# Patient Record
Sex: MALE | Race: OTHER | HISPANIC OR LATINO | URBAN - METROPOLITAN AREA
[De-identification: names, ages, dates, MRNs, and addresses within clinical notes are randomized per-mention and may not be internally consistent; named-entity substitution may affect disease eponyms.]

---

## 2017-02-21 ENCOUNTER — OUTPATIENT (OUTPATIENT)
Dept: OUTPATIENT SERVICES | Facility: HOSPITAL | Age: 27
LOS: 1 days | End: 2017-02-21
Payer: MEDICAID

## 2017-02-21 ENCOUNTER — APPOINTMENT (OUTPATIENT)
Dept: INFECTIOUS DISEASE | Facility: CLINIC | Age: 27
End: 2017-02-21

## 2017-02-21 VITALS
HEART RATE: 70 BPM | TEMPERATURE: 98.3 F | BODY MASS INDEX: 19.55 KG/M2 | HEIGHT: 69 IN | DIASTOLIC BLOOD PRESSURE: 71 MMHG | SYSTOLIC BLOOD PRESSURE: 115 MMHG | OXYGEN SATURATION: 98 % | WEIGHT: 132 LBS

## 2017-02-21 DIAGNOSIS — B20 HUMAN IMMUNODEFICIENCY VIRUS [HIV] DISEASE: ICD-10-CM

## 2017-02-21 LAB
ALBUMIN SERPL ELPH-MCNC: 4.6 G/DL
ALP BLD-CCNC: 66 U/L
ALT SERPL-CCNC: 18 U/L
ANION GAP SERPL CALC-SCNC: 14 MMOL/L
AST SERPL-CCNC: 18 U/L
BILIRUB SERPL-MCNC: 0.3 MG/DL
BUN SERPL-MCNC: 11 MG/DL
CALCIUM SERPL-MCNC: 9.9 MG/DL
CHLORIDE SERPL-SCNC: 103 MMOL/L
CO2 SERPL-SCNC: 24 MMOL/L
CREAT SERPL-MCNC: 1.08 MG/DL
GLUCOSE SERPL-MCNC: 99 MG/DL
POTASSIUM SERPL-SCNC: 4.9 MMOL/L
PROT SERPL-MCNC: 7.2 G/DL
SODIUM SERPL-SCNC: 141 MMOL/L

## 2017-02-21 PROCEDURE — 36415 COLL VENOUS BLD VENIPUNCTURE: CPT

## 2017-02-21 PROCEDURE — 90670 PCV13 VACCINE IM: CPT

## 2017-02-21 PROCEDURE — G0463: CPT | Mod: 25

## 2017-02-21 PROCEDURE — 90834 PSYTX W PT 45 MINUTES: CPT | Mod: 25

## 2017-02-21 PROCEDURE — G0009: CPT

## 2017-02-22 LAB
C TRACH RRNA SPEC QL NAA+PROBE: NORMAL
CD3 CELLS # BLD: 1523 /UL
CD3 CELLS NFR BLD: 77 %
CD3+CD4+ CELLS # BLD: 549 /UL
CD3+CD4+ CELLS NFR BLD: 28 %
CD3+CD4+ CELLS/CD3+CD8+ CLL SPEC: 0.58 RATIO
CD3+CD8+ CELLS # SPEC: 945 /UL
CD3+CD8+ CELLS NFR BLD: 48 %
N GONORRHOEA RRNA SPEC QL NAA+PROBE: NORMAL
SOURCE AMPLIFICATION: NORMAL

## 2017-02-23 DIAGNOSIS — Z23 ENCOUNTER FOR IMMUNIZATION: ICD-10-CM

## 2017-02-23 LAB
HIV1 RNA # SERPL NAA+PROBE: NOT DETECTED COPIES/ML
VIRAL LOAD LOG: NOT DETECTED LG10COP/ML

## 2017-03-06 ENCOUNTER — APPOINTMENT (OUTPATIENT)
Dept: GASTROENTEROLOGY | Facility: CLINIC | Age: 27
End: 2017-03-06

## 2017-03-23 ENCOUNTER — RX RENEWAL (OUTPATIENT)
Age: 27
End: 2017-03-23

## 2017-06-22 ENCOUNTER — APPOINTMENT (OUTPATIENT)
Dept: INFECTIOUS DISEASE | Facility: CLINIC | Age: 27
End: 2017-06-22
Payer: SELF-PAY

## 2017-06-22 VITALS
TEMPERATURE: 97.6 F | SYSTOLIC BLOOD PRESSURE: 123 MMHG | OXYGEN SATURATION: 99 % | HEIGHT: 69 IN | WEIGHT: 126 LBS | HEART RATE: 89 BPM | DIASTOLIC BLOOD PRESSURE: 77 MMHG | BODY MASS INDEX: 18.66 KG/M2

## 2017-06-22 DIAGNOSIS — F12.90 CANNABIS USE, UNSPECIFIED, UNCOMPLICATED: ICD-10-CM

## 2017-06-22 PROCEDURE — 99214 OFFICE O/P EST MOD 30 MIN: CPT

## 2017-06-23 LAB
ALBUMIN SERPL ELPH-MCNC: 4.4 G/DL
ALP BLD-CCNC: 84 U/L
ALT SERPL-CCNC: 10 U/L
ANION GAP SERPL CALC-SCNC: 15 MMOL/L
AST SERPL-CCNC: 22 U/L
BILIRUB SERPL-MCNC: 0.6 MG/DL
BUN SERPL-MCNC: 9 MG/DL
CALCIUM SERPL-MCNC: 9.7 MG/DL
CD3 CELLS # BLD: 1257 /UL
CD3 CELLS NFR BLD: 77 %
CD3+CD4+ CELLS # BLD: 466 /UL
CD3+CD4+ CELLS NFR BLD: 28 %
CD3+CD4+ CELLS/CD3+CD8+ CLL SPEC: 0.6 RATIO
CD3+CD8+ CELLS # SPEC: 779 /UL
CD3+CD8+ CELLS NFR BLD: 47 %
CHLORIDE SERPL-SCNC: 102 MMOL/L
CO2 SERPL-SCNC: 25 MMOL/L
CREAT SERPL-MCNC: 0.99 MG/DL
GLUCOSE SERPL-MCNC: 82 MG/DL
POTASSIUM SERPL-SCNC: 4.1 MMOL/L
PROT SERPL-MCNC: 7.8 G/DL
SODIUM SERPL-SCNC: 142 MMOL/L

## 2017-06-26 LAB
HIV1 RNA # SERPL NAA+PROBE: NORMAL
HIV1 RNA # SERPL NAA+PROBE: NOT DETECTED COPIES/ML
VIRAL LOAD INTERP: NORMAL
VIRAL LOAD LOG: NOT DETECTED LG COP/ML

## 2017-12-20 ENCOUNTER — APPOINTMENT (OUTPATIENT)
Dept: INFECTIOUS DISEASE | Facility: CLINIC | Age: 27
End: 2017-12-20
Payer: MEDICAID

## 2017-12-20 ENCOUNTER — APPOINTMENT (OUTPATIENT)
Dept: INFECTIOUS DISEASE | Facility: CLINIC | Age: 27
End: 2017-12-20

## 2017-12-20 ENCOUNTER — LABORATORY RESULT (OUTPATIENT)
Age: 27
End: 2017-12-20

## 2017-12-20 ENCOUNTER — OTHER (OUTPATIENT)
Age: 27
End: 2017-12-20

## 2017-12-20 VITALS
BODY MASS INDEX: 20.73 KG/M2 | WEIGHT: 140 LBS | DIASTOLIC BLOOD PRESSURE: 75 MMHG | TEMPERATURE: 98.2 F | OXYGEN SATURATION: 97 % | HEART RATE: 83 BPM | HEIGHT: 69 IN | SYSTOLIC BLOOD PRESSURE: 127 MMHG | RESPIRATION RATE: 20 BRPM

## 2017-12-20 PROCEDURE — 99214 OFFICE O/P EST MOD 30 MIN: CPT | Mod: 25

## 2017-12-20 PROCEDURE — G0009: CPT

## 2017-12-20 PROCEDURE — 90732 PPSV23 VACC 2 YRS+ SUBQ/IM: CPT

## 2017-12-20 PROCEDURE — 90686 IIV4 VACC NO PRSV 0.5 ML IM: CPT

## 2017-12-20 PROCEDURE — 90472 IMMUNIZATION ADMIN EACH ADD: CPT

## 2017-12-20 PROCEDURE — G0008: CPT

## 2017-12-21 LAB
ALBUMIN SERPL ELPH-MCNC: 4.6 G/DL
ALP BLD-CCNC: 87 U/L
ALT SERPL-CCNC: 21 U/L
ANION GAP SERPL CALC-SCNC: 12 MMOL/L
AST SERPL-CCNC: 29 U/L
BILIRUB SERPL-MCNC: 0.4 MG/DL
BUN SERPL-MCNC: 11 MG/DL
C TRACH RRNA SPEC QL NAA+PROBE: NOT DETECTED
CALCIUM SERPL-MCNC: 9.9 MG/DL
CD3 CELLS # BLD: 1394 /UL
CD3 CELLS NFR BLD: 74 %
CD3+CD4+ CELLS # BLD: 490 /UL
CD3+CD4+ CELLS NFR BLD: 26 %
CD3+CD4+ CELLS/CD3+CD8+ CLL SPEC: 0.56 RATIO
CD3+CD8+ CELLS # SPEC: 877 /UL
CD3+CD8+ CELLS NFR BLD: 47 %
CHLORIDE SERPL-SCNC: 100 MMOL/L
CHOLEST SERPL-MCNC: 220 MG/DL
CHOLEST/HDLC SERPL: 5.5 RATIO
CO2 SERPL-SCNC: 27 MMOL/L
CREAT SERPL-MCNC: 0.9 MG/DL
GLUCOSE SERPL-MCNC: 84 MG/DL
HDLC SERPL-MCNC: 40 MG/DL
LDLC SERPL CALC-MCNC: 138 MG/DL
N GONORRHOEA RRNA SPEC QL NAA+PROBE: NOT DETECTED
POTASSIUM SERPL-SCNC: 4.2 MMOL/L
PROT SERPL-MCNC: 8.4 G/DL
SODIUM SERPL-SCNC: 139 MMOL/L
SOURCE AMPLIFICATION: NORMAL
T PALLIDUM AB SER QL IA: POSITIVE
TRIGL SERPL-MCNC: 211 MG/DL

## 2017-12-21 RX ORDER — NUT.TX.IMPAIRED DIGESTIVE FXN 0.035-1/ML
LIQUID (ML) ORAL
Qty: 240 | Refills: 0 | Status: ACTIVE | COMMUNITY
Start: 2017-12-21 | End: 1900-01-01

## 2017-12-22 ENCOUNTER — MOBILE ON CALL (OUTPATIENT)
Age: 27
End: 2017-12-22

## 2017-12-24 ENCOUNTER — MOBILE ON CALL (OUTPATIENT)
Age: 27
End: 2017-12-24

## 2018-01-01 LAB
HIV1 RNA # SERPL NAA+PROBE: NORMAL
HIV1 RNA # SERPL NAA+PROBE: NOT DETECTED
VIRAL LOAD INTERP: NORMAL
VIRAL LOAD LOG: NOT DETECTED

## 2018-06-25 ENCOUNTER — RX RENEWAL (OUTPATIENT)
Age: 28
End: 2018-06-25

## 2018-10-17 ENCOUNTER — APPOINTMENT (OUTPATIENT)
Dept: INFECTIOUS DISEASE | Facility: CLINIC | Age: 28
End: 2018-10-17

## 2019-01-31 ENCOUNTER — APPOINTMENT (OUTPATIENT)
Dept: INFECTIOUS DISEASE | Facility: CLINIC | Age: 29
End: 2019-01-31

## 2019-01-31 ENCOUNTER — OUTPATIENT (OUTPATIENT)
Dept: OUTPATIENT SERVICES | Facility: HOSPITAL | Age: 29
LOS: 1 days | End: 2019-01-31
Payer: COMMERCIAL

## 2019-01-31 ENCOUNTER — LABORATORY RESULT (OUTPATIENT)
Age: 29
End: 2019-01-31

## 2019-01-31 ENCOUNTER — APPOINTMENT (OUTPATIENT)
Dept: INFECTIOUS DISEASE | Facility: CLINIC | Age: 29
End: 2019-01-31
Payer: COMMERCIAL

## 2019-01-31 VITALS
HEART RATE: 76 BPM | SYSTOLIC BLOOD PRESSURE: 118 MMHG | WEIGHT: 138 LBS | TEMPERATURE: 97.8 F | HEIGHT: 69 IN | BODY MASS INDEX: 20.44 KG/M2 | DIASTOLIC BLOOD PRESSURE: 72 MMHG | OXYGEN SATURATION: 99 %

## 2019-01-31 DIAGNOSIS — Z00.00 ENCOUNTER FOR GENERAL ADULT MEDICAL EXAMINATION W/OUT ABNORMAL FINDINGS: ICD-10-CM

## 2019-01-31 DIAGNOSIS — B20 HUMAN IMMUNODEFICIENCY VIRUS [HIV] DISEASE: ICD-10-CM

## 2019-01-31 DIAGNOSIS — Z23 ENCOUNTER FOR IMMUNIZATION: ICD-10-CM

## 2019-01-31 PROCEDURE — 90686 IIV4 VACC NO PRSV 0.5 ML IM: CPT

## 2019-01-31 PROCEDURE — G0008: CPT

## 2019-01-31 PROCEDURE — G0463: CPT | Mod: 25

## 2019-01-31 PROCEDURE — 99214 OFFICE O/P EST MOD 30 MIN: CPT

## 2019-01-31 PROCEDURE — 90834 PSYTX W PT 45 MINUTES: CPT

## 2019-01-31 NOTE — HISTORY OF PRESENT ILLNESS
[FreeTextEntry1] : This is a 28 year old M with well controlled HIV infection.  Today, the patient presents for a follow up visit.  AMY is doing well with his medications.  The adherence to the HIV regimen has been good and there has been no known changes to the regimen.\par \par Has been out of care for over one year.\par \par Taking meds he reports.\par working.

## 2019-01-31 NOTE — REVIEW OF SYSTEMS
[Fever] : no fever [Chills] : no chills [Shortness Of Breath] : no shortness of breath [Abdominal Pain] : no abdominal pain [Dysuria] : no dysuria [Easy Bleeding] : no tendency for easy bleeding [Negative] : Heme/Lymph

## 2019-01-31 NOTE — ASSESSMENT
[FreeTextEntry1] : HIV - continue same HIV med.\par check t cells and vl\par \par renal - check cr\par \par cv/pulm - no active issues\par \par hm - flu vaccine\par \par  [HIV Education] : HIV Education

## 2019-01-31 NOTE — PHYSICAL EXAM
[General Appearance - Alert] : alert [General Appearance - In No Acute Distress] : in no acute distress [Sclera] : the sclera and conjunctiva were normal [Outer Ear] : the ears and nose were normal in appearance [Neck Appearance] : the appearance of the neck was normal [Auscultation Breath Sounds / Voice Sounds] : lungs were clear to auscultation bilaterally [Heart Rate And Rhythm] : heart rate was normal and rhythm regular [Heart Sounds] : normal S1 and S2 [Full Pulse] : the pedal pulses are present [Edema] : there was no peripheral edema [Abdomen Soft] : soft [No Palpable Adenopathy] : no palpable adenopathy [Skin Color & Pigmentation] : normal skin color and pigmentation [] : no rash [Cranial Nerves] : cranial nerves 2-12 were intact [Oriented To Time, Place, And Person] : oriented to person, place, and time [Affect] : the affect was normal

## 2019-02-01 LAB
ALBUMIN SERPL ELPH-MCNC: 4.9 G/DL
ALP BLD-CCNC: 71 U/L
ALT SERPL-CCNC: 39 U/L
ANION GAP SERPL CALC-SCNC: 8 MMOL/L
AST SERPL-CCNC: 40 U/L
BILIRUB SERPL-MCNC: 0.5 MG/DL
BUN SERPL-MCNC: 8 MG/DL
C TRACH RRNA SPEC QL NAA+PROBE: NOT DETECTED
C TRACH RRNA SPEC QL NAA+PROBE: NOT DETECTED
CALCIUM SERPL-MCNC: 10.2 MG/DL
CD3 CELLS # BLD: 1504 /UL
CD3 CELLS NFR BLD: 74 %
CD3+CD4+ CELLS # BLD: 571 /UL
CD3+CD4+ CELLS NFR BLD: 28 %
CD3+CD4+ CELLS/CD3+CD8+ CLL SPEC: 0.98 RATIO
CD3+CD8+ CELLS # SPEC: 582 /UL
CD3+CD8+ CELLS NFR BLD: 28 %
CHLORIDE SERPL-SCNC: 99 MMOL/L
CO2 SERPL-SCNC: 29 MMOL/L
CREAT SERPL-MCNC: 0.96 MG/DL
GLUCOSE SERPL-MCNC: 75 MG/DL
HIV1 RNA # SERPL NAA+PROBE: NORMAL
HIV1 RNA # SERPL NAA+PROBE: NORMAL COPIES/ML
N GONORRHOEA RRNA SPEC QL NAA+PROBE: NOT DETECTED
N GONORRHOEA RRNA SPEC QL NAA+PROBE: NOT DETECTED
POTASSIUM SERPL-SCNC: 4.3 MMOL/L
PROT SERPL-MCNC: 8.2 G/DL
SODIUM SERPL-SCNC: 136 MMOL/L
SOURCE AMPLIFICATION: NORMAL
SOURCE ANAL: NORMAL
VIRAL LOAD INTERP: NORMAL
VIRAL LOAD LOG: NORMAL LG COP/ML

## 2019-02-04 LAB
C TRACH RRNA SPEC QL NAA+PROBE: NOT DETECTED
N GONORRHOEA RRNA SPEC QL NAA+PROBE: NOT DETECTED
SOURCE ORAL: NORMAL
T PALLIDUM AB SER QL IA: POSITIVE

## 2019-02-07 ENCOUNTER — RX RENEWAL (OUTPATIENT)
Age: 29
End: 2019-02-07

## 2019-05-31 ENCOUNTER — APPOINTMENT (OUTPATIENT)
Dept: INFECTIOUS DISEASE | Facility: CLINIC | Age: 29
End: 2019-05-31

## 2019-10-29 ENCOUNTER — CHART COPY (OUTPATIENT)
Age: 29
End: 2019-10-29

## 2019-11-07 ENCOUNTER — RX RENEWAL (OUTPATIENT)
Age: 29
End: 2019-11-07

## 2019-11-26 ENCOUNTER — APPOINTMENT (OUTPATIENT)
Dept: INFECTIOUS DISEASE | Facility: CLINIC | Age: 29
End: 2019-11-26

## 2020-01-08 ENCOUNTER — RX RENEWAL (OUTPATIENT)
Age: 30
End: 2020-01-08

## 2020-02-10 ENCOUNTER — RX RENEWAL (OUTPATIENT)
Age: 30
End: 2020-02-10

## 2020-03-19 ENCOUNTER — RX RENEWAL (OUTPATIENT)
Age: 30
End: 2020-03-19

## 2020-04-30 ENCOUNTER — APPOINTMENT (OUTPATIENT)
Dept: INFECTIOUS DISEASE | Facility: CLINIC | Age: 30
End: 2020-04-30

## 2020-06-15 ENCOUNTER — APPOINTMENT (OUTPATIENT)
Dept: INFECTIOUS DISEASE | Facility: CLINIC | Age: 30
End: 2020-06-15

## 2020-07-06 ENCOUNTER — RX RENEWAL (OUTPATIENT)
Age: 30
End: 2020-07-06

## 2020-08-03 ENCOUNTER — APPOINTMENT (OUTPATIENT)
Dept: INFECTIOUS DISEASE | Facility: CLINIC | Age: 30
End: 2020-08-03

## 2020-11-20 ENCOUNTER — NON-APPOINTMENT (OUTPATIENT)
Age: 30
End: 2020-11-20

## 2021-01-06 ENCOUNTER — NON-APPOINTMENT (OUTPATIENT)
Age: 31
End: 2021-01-06

## 2021-01-11 ENCOUNTER — NON-APPOINTMENT (OUTPATIENT)
Age: 31
End: 2021-01-11

## 2021-07-22 ENCOUNTER — NON-APPOINTMENT (OUTPATIENT)
Age: 31
End: 2021-07-22

## 2021-07-26 ENCOUNTER — NON-APPOINTMENT (OUTPATIENT)
Age: 31
End: 2021-07-26

## 2021-09-29 ENCOUNTER — NON-APPOINTMENT (OUTPATIENT)
Age: 31
End: 2021-09-29

## 2021-10-17 ENCOUNTER — FORM ENCOUNTER (OUTPATIENT)
Age: 31
End: 2021-10-17

## 2021-10-18 ENCOUNTER — APPOINTMENT (OUTPATIENT)
Dept: INFECTIOUS DISEASE | Facility: CLINIC | Age: 31
End: 2021-10-18
Payer: MEDICAID

## 2021-10-18 ENCOUNTER — LABORATORY RESULT (OUTPATIENT)
Age: 31
End: 2021-10-18

## 2021-10-18 ENCOUNTER — MED ADMIN CHARGE (OUTPATIENT)
Age: 31
End: 2021-10-18

## 2021-10-18 VITALS
DIASTOLIC BLOOD PRESSURE: 74 MMHG | BODY MASS INDEX: 21.03 KG/M2 | OXYGEN SATURATION: 97 % | HEIGHT: 69 IN | WEIGHT: 142 LBS | SYSTOLIC BLOOD PRESSURE: 118 MMHG | TEMPERATURE: 98.5 F | HEART RATE: 74 BPM

## 2021-10-18 DIAGNOSIS — Z23 ENCOUNTER FOR IMMUNIZATION: ICD-10-CM

## 2021-10-18 PROCEDURE — 99213 OFFICE O/P EST LOW 20 MIN: CPT

## 2021-10-18 NOTE — HISTORY OF PRESENT ILLNESS
[FreeTextEntry1] : Reason For HIV  Visit on 10/18/21\par AMY COTA is a 31 year old male being seen for a follow-up visit. \par This is a 31 year old M with well controlled HIV infection. Today, the patient presents for a follow up visit. AMY is doing well with his medications. The adherence to the HIV regimen has been good and there has been no known changes to the regimen.\par Has been out of care for over one year. since 2019\par Had moderna both doses 5/25 and 6/22/21\par  on 6 month old. \par Works for [employer]. \par Plan 10/18/21\par 1) stop stribild and start Biktarvy\par 2)_ labs today \par 3) see in a month\par  \par  \par Active Problems\par HIV disease (042) (B20)\par Syphilis (097.9) (A53.9)\par \par Past Medical History\par History of depression (V11.8) (Z86.59)\par Personal history of asthma (V12.69) (Z87.09)\par History of Visit for dental examination (V72.2) (Z01.20)\par History of Visit for eye and vision exam (V72.0) (Z01.00)\par \par Current Meds\par Ensure Oral Liquid; USE AS DIRECTED\par Stribild [phone] MG Oral Tablet; TAKE 1 TABLET BY MOUTH DAILY\par \par Allergies\par No Known Allergies\par \par

## 2021-10-19 LAB
25(OH)D3 SERPL-MCNC: 16.3 NG/ML
ALBUMIN SERPL ELPH-MCNC: 4.9 G/DL
ALP BLD-CCNC: 72 U/L
ALT SERPL-CCNC: 30 U/L
ANION GAP SERPL CALC-SCNC: 15 MMOL/L
APPEARANCE: CLEAR
AST SERPL-CCNC: 29 U/L
BACTERIA: NEGATIVE
BASOPHILS # BLD AUTO: 0.01 K/UL
BASOPHILS NFR BLD AUTO: 0.2 %
BILIRUB SERPL-MCNC: 0.8 MG/DL
BILIRUBIN URINE: NEGATIVE
BLOOD URINE: NEGATIVE
BUN SERPL-MCNC: 8 MG/DL
CALCIUM SERPL-MCNC: 10 MG/DL
CD3 CELLS # BLD: 1049 /UL
CD3 CELLS NFR BLD: 79 %
CD3+CD4+ CELLS # BLD: 389 /UL
CD3+CD4+ CELLS NFR BLD: 29 %
CD3+CD4+ CELLS/CD3+CD8+ CLL SPEC: 0.63 RATIO
CD3+CD8+ CELLS # SPEC: 622 /UL
CD3+CD8+ CELLS NFR BLD: 47 %
CHLORIDE SERPL-SCNC: 101 MMOL/L
CHOLEST SERPL-MCNC: 277 MG/DL
CO2 SERPL-SCNC: 25 MMOL/L
COLOR: YELLOW
CREAT SERPL-MCNC: 0.92 MG/DL
EOSINOPHIL # BLD AUTO: 0.01 K/UL
EOSINOPHIL NFR BLD AUTO: 0.2 %
ESTIMATED AVERAGE GLUCOSE: 82 MG/DL
GLUCOSE QUALITATIVE U: NEGATIVE
GLUCOSE SERPL-MCNC: 98 MG/DL
HBA1C MFR BLD HPLC: 4.5 %
HBV SURFACE AG SER QL: NONREACTIVE
HCT VFR BLD CALC: 48.3 %
HCV AB SER QL: NONREACTIVE
HCV S/CO RATIO: 0.19 S/CO
HDLC SERPL-MCNC: 62 MG/DL
HGB BLD-MCNC: 15.7 G/DL
HIV1 RNA # SERPL NAA+PROBE: ABNORMAL
HIV1 RNA # SERPL NAA+PROBE: ABNORMAL COPIES/ML
HYALINE CASTS: 0 /LPF
IMM GRANULOCYTES NFR BLD AUTO: 0.2 %
KETONES URINE: NEGATIVE
LDLC SERPL CALC-MCNC: 196 MG/DL
LEUKOCYTE ESTERASE URINE: NEGATIVE
LYMPHOCYTES # BLD AUTO: 1.37 K/UL
LYMPHOCYTES NFR BLD AUTO: 34.2 %
MAN DIFF?: NORMAL
MCHC RBC-ENTMCNC: 32.5 GM/DL
MCHC RBC-ENTMCNC: 33.6 PG
MCV RBC AUTO: 103.4 FL
MICROSCOPIC-UA: NORMAL
MONOCYTES # BLD AUTO: 0.38 K/UL
MONOCYTES NFR BLD AUTO: 9.5 %
NEUTROPHILS # BLD AUTO: 2.23 K/UL
NEUTROPHILS NFR BLD AUTO: 55.7 %
NITRITE URINE: NEGATIVE
NONHDLC SERPL-MCNC: 215 MG/DL
PH URINE: 8
PLATELET # BLD AUTO: 157 K/UL
POTASSIUM SERPL-SCNC: 4.3 MMOL/L
PROT SERPL-MCNC: 8.2 G/DL
PROTEIN URINE: NEGATIVE
RBC # BLD: 4.67 M/UL
RBC # FLD: 12.6 %
RED BLOOD CELLS URINE: 1 /HPF
SODIUM SERPL-SCNC: 141 MMOL/L
SPECIFIC GRAVITY URINE: 1.02
SQUAMOUS EPITHELIAL CELLS: 1 /HPF
TRIGL SERPL-MCNC: 93 MG/DL
TSH SERPL-ACNC: 1.44 UIU/ML
UROBILINOGEN URINE: ABNORMAL
VIRAL LOAD INTERP: NORMAL
VIRAL LOAD LOG: ABNORMAL LG COP/ML
WBC # FLD AUTO: 4.01 K/UL
WHITE BLOOD CELLS URINE: 0 /HPF

## 2021-10-20 LAB
C TRACH RRNA SPEC QL NAA+PROBE: NOT DETECTED
N GONORRHOEA RRNA SPEC QL NAA+PROBE: NOT DETECTED
SOURCE AMPLIFICATION: NORMAL
SOURCE ANAL: NORMAL
SOURCE ORAL: NORMAL
T PALLIDUM AB SER QL IA: POSITIVE

## 2021-11-18 ENCOUNTER — APPOINTMENT (OUTPATIENT)
Dept: INFECTIOUS DISEASE | Facility: CLINIC | Age: 31
End: 2021-11-18

## 2021-12-21 ENCOUNTER — APPOINTMENT (OUTPATIENT)
Dept: INFECTIOUS DISEASE | Facility: CLINIC | Age: 31
End: 2021-12-21

## 2021-12-22 ENCOUNTER — NON-APPOINTMENT (OUTPATIENT)
Age: 31
End: 2021-12-22

## 2021-12-23 ENCOUNTER — NON-APPOINTMENT (OUTPATIENT)
Age: 31
End: 2021-12-23

## 2022-01-12 ENCOUNTER — NON-APPOINTMENT (OUTPATIENT)
Age: 32
End: 2022-01-12

## 2022-02-02 ENCOUNTER — NON-APPOINTMENT (OUTPATIENT)
Age: 32
End: 2022-02-02

## 2022-02-07 ENCOUNTER — APPOINTMENT (OUTPATIENT)
Dept: INFECTIOUS DISEASE | Facility: CLINIC | Age: 32
End: 2022-02-07

## 2022-02-08 ENCOUNTER — NON-APPOINTMENT (OUTPATIENT)
Age: 32
End: 2022-02-08

## 2022-02-09 ENCOUNTER — NON-APPOINTMENT (OUTPATIENT)
Age: 32
End: 2022-02-09

## 2022-02-10 ENCOUNTER — NON-APPOINTMENT (OUTPATIENT)
Age: 32
End: 2022-02-10

## 2022-02-24 ENCOUNTER — RX RENEWAL (OUTPATIENT)
Age: 32
End: 2022-02-24

## 2022-03-07 ENCOUNTER — NON-APPOINTMENT (OUTPATIENT)
Age: 32
End: 2022-03-07

## 2022-03-21 ENCOUNTER — NON-APPOINTMENT (OUTPATIENT)
Age: 32
End: 2022-03-21

## 2022-03-22 ENCOUNTER — APPOINTMENT (OUTPATIENT)
Dept: INFECTIOUS DISEASE | Facility: CLINIC | Age: 32
End: 2022-03-22
Payer: MEDICAID

## 2022-03-22 ENCOUNTER — LABORATORY RESULT (OUTPATIENT)
Age: 32
End: 2022-03-22

## 2022-03-22 VITALS
WEIGHT: 142 LBS | DIASTOLIC BLOOD PRESSURE: 86 MMHG | SYSTOLIC BLOOD PRESSURE: 119 MMHG | RESPIRATION RATE: 18 BRPM | OXYGEN SATURATION: 94 % | HEIGHT: 69 IN | BODY MASS INDEX: 21.03 KG/M2 | TEMPERATURE: 98.9 F | HEART RATE: 87 BPM

## 2022-03-22 DIAGNOSIS — F17.200 NICOTINE DEPENDENCE, UNSPECIFIED, UNCOMPLICATED: ICD-10-CM

## 2022-03-22 PROCEDURE — 99214 OFFICE O/P EST MOD 30 MIN: CPT

## 2022-03-22 NOTE — HISTORY OF PRESENT ILLNESS
[FreeTextEntry1] : 3/22/22\par AMY COTA is a 31 year old male being seen for a HIV follow-up visit. \par This is a 31 year old M with well controlled HIV infection. Today, the patient presents for a follow up visit. AMY is doing well with his medications. The adherence to the HIV regimen has been good and there has been no known changes to the regimen.\par Has been out of care for over one year. since 2019\par Had Moderna both doses 5/25 and 6/22/21\par  on 6 month old. \par Works for [employer]. \par Plan 3/22/22\par 1) Continue Biktarvy\par 2) all labs today \par 3) family hx of colon cancer needs GI referral  and smoking cessation. Pt also would like to start Cabanuva, states hates taking the pills and sometimes forgets to take them \par  \par  \par Active Problems\par HIV disease (042) (B20)\par Syphilis (097.9) (A53.9)\par \par Past Medical History\par History of depression (V11.8) (Z86.59)\par Personal history of asthma (V12.69) (Z87.09)\par History of Visit for dental examination (V72.2) (Z01.20)\par History of Visit for eye and vision exam (V72.0) (Z01.00)\par \par Current Meds\par Ensure Oral Liquid; USE AS DIRECTED\par Stribild [id number] MG Oral Tablet; TAKE 1 TABLET BY MOUTH DAILY\par \par Allergies\par No Known Allergies\par \par \par  \par Active Problems\par HIV disease (042) (B20)\par Syphilis (097.9) (A53.9)\par \par Past Medical History\par History of depression (V11.8) (Z86.59)\par Personal history of asthma (V12.69) (Z87.09)\par History of Visit for dental examination (V72.2) (Z01.20)\par History of Visit for eye and vision exam (V72.0) (Z01.00)\par \par Current Meds\par Ensure Oral Liquid; USE AS DIRECTED\par Stribild [phone] MG Oral Tablet; TAKE 1 TABLET BY MOUTH DAILY\par \par Allergies\par No Known Allergies\par

## 2022-03-23 ENCOUNTER — APPOINTMENT (OUTPATIENT)
Dept: GASTROENTEROLOGY | Facility: CLINIC | Age: 32
End: 2022-03-23
Payer: MEDICAID

## 2022-03-23 VITALS
SYSTOLIC BLOOD PRESSURE: 134 MMHG | HEIGHT: 69 IN | TEMPERATURE: 97.6 F | BODY MASS INDEX: 21.33 KG/M2 | WEIGHT: 144 LBS | RESPIRATION RATE: 18 BRPM | OXYGEN SATURATION: 96 % | HEART RATE: 78 BPM | DIASTOLIC BLOOD PRESSURE: 82 MMHG

## 2022-03-23 DIAGNOSIS — K59.00 CONSTIPATION, UNSPECIFIED: ICD-10-CM

## 2022-03-23 DIAGNOSIS — Z12.11 ENCOUNTER FOR SCREENING FOR MALIGNANT NEOPLASM OF COLON: ICD-10-CM

## 2022-03-23 DIAGNOSIS — Z80.0 ENCOUNTER FOR SCREENING FOR MALIGNANT NEOPLASM OF COLON: ICD-10-CM

## 2022-03-23 DIAGNOSIS — E55.9 VITAMIN D DEFICIENCY, UNSPECIFIED: ICD-10-CM

## 2022-03-23 LAB
25(OH)D3 SERPL-MCNC: 9.8 NG/ML
ALBUMIN SERPL ELPH-MCNC: 4.6 G/DL
ALP BLD-CCNC: 74 U/L
ALT SERPL-CCNC: 28 U/L
ANION GAP SERPL CALC-SCNC: 15 MMOL/L
APPEARANCE: CLEAR
AST SERPL-CCNC: 33 U/L
BACTERIA: NEGATIVE
BASOPHILS # BLD AUTO: 0.01 K/UL
BASOPHILS NFR BLD AUTO: 0.2 %
BILIRUB SERPL-MCNC: 1 MG/DL
BILIRUBIN URINE: NEGATIVE
BLOOD URINE: NEGATIVE
BUN SERPL-MCNC: 7 MG/DL
C TRACH RRNA SPEC QL NAA+PROBE: NOT DETECTED
CALCIUM SERPL-MCNC: 9.6 MG/DL
CD3 CELLS # BLD: 1494 /UL
CD3 CELLS NFR BLD: 79 %
CD3+CD4+ CELLS # BLD: 538 /UL
CD3+CD4+ CELLS NFR BLD: 29 %
CD3+CD4+ CELLS/CD3+CD8+ CLL SPEC: 0.72 RATIO
CD3+CD8+ CELLS # SPEC: 753 /UL
CD3+CD8+ CELLS NFR BLD: 40 %
CHLORIDE SERPL-SCNC: 104 MMOL/L
CHOLEST SERPL-MCNC: 228 MG/DL
CO2 SERPL-SCNC: 24 MMOL/L
COLOR: ABNORMAL
CREAT SERPL-MCNC: 1.04 MG/DL
EGFR: 100 ML/MIN/1.73M2
EOSINOPHIL # BLD AUTO: 0.01 K/UL
EOSINOPHIL NFR BLD AUTO: 0.2 %
ESTIMATED AVERAGE GLUCOSE: 77 MG/DL
GLUCOSE QUALITATIVE U: NEGATIVE
GLUCOSE SERPL-MCNC: 86 MG/DL
HBA1C MFR BLD HPLC: 4.3 %
HCT VFR BLD CALC: 46.8 %
HCV AB SER QL: NONREACTIVE
HCV S/CO RATIO: 0.17 S/CO
HDLC SERPL-MCNC: 51 MG/DL
HGB BLD-MCNC: 15.1 G/DL
HIV1 RNA # SERPL NAA+PROBE: NORMAL
HIV1 RNA # SERPL NAA+PROBE: NORMAL COPIES/ML
HYALINE CASTS: 3 /LPF
IMM GRANULOCYTES NFR BLD AUTO: 0.2 %
KETONES URINE: NEGATIVE
LDLC SERPL CALC-MCNC: 156 MG/DL
LEUKOCYTE ESTERASE URINE: NEGATIVE
LYMPHOCYTES # BLD AUTO: 1.94 K/UL
LYMPHOCYTES NFR BLD AUTO: 40.1 %
MAN DIFF?: NORMAL
MCHC RBC-ENTMCNC: 32.3 GM/DL
MCHC RBC-ENTMCNC: 33.7 PG
MCV RBC AUTO: 104.5 FL
MICROSCOPIC-UA: NORMAL
MONOCYTES # BLD AUTO: 0.46 K/UL
MONOCYTES NFR BLD AUTO: 9.5 %
N GONORRHOEA RRNA SPEC QL NAA+PROBE: NOT DETECTED
NEUTROPHILS # BLD AUTO: 2.41 K/UL
NEUTROPHILS NFR BLD AUTO: 49.8 %
NITRITE URINE: NEGATIVE
NONHDLC SERPL-MCNC: 176 MG/DL
PH URINE: 6
PLATELET # BLD AUTO: 183 K/UL
POTASSIUM SERPL-SCNC: 4 MMOL/L
PROT SERPL-MCNC: 7.6 G/DL
PROTEIN URINE: ABNORMAL
PSA SERPL-MCNC: 0.68 NG/ML
RBC # BLD: 4.48 M/UL
RBC # FLD: 12.7 %
RED BLOOD CELLS URINE: 5 /HPF
SODIUM SERPL-SCNC: 142 MMOL/L
SOURCE AMPLIFICATION: NORMAL
SOURCE ANAL: NORMAL
SOURCE ORAL: NORMAL
SPECIFIC GRAVITY URINE: 1.03
SQUAMOUS EPITHELIAL CELLS: 1 /HPF
TRIGL SERPL-MCNC: 101 MG/DL
TSH SERPL-ACNC: 1.3 UIU/ML
UROBILINOGEN URINE: ABNORMAL
VIRAL LOAD INTERP: NORMAL
VIRAL LOAD LOG: NORMAL LG COP/ML
WBC # FLD AUTO: 4.84 K/UL
WHITE BLOOD CELLS URINE: 1 /HPF

## 2022-03-23 PROCEDURE — 99204 OFFICE O/P NEW MOD 45 MIN: CPT

## 2022-03-23 RX ORDER — POLYETHYLENE GLYCOL 3350 17 G/17G
17 POWDER, FOR SOLUTION ORAL DAILY
Qty: 1 | Refills: 2 | Status: ACTIVE | COMMUNITY
Start: 2022-03-23 | End: 1900-01-01

## 2022-03-23 NOTE — PHYSICAL EXAM
[General Appearance - Alert] : alert [General Appearance - In No Acute Distress] : in no acute distress [General Appearance - Well Nourished] : well nourished [General Appearance - Well Developed] : well developed [Sclera] : the sclera and conjunctiva were normal [Hearing Threshold Finger Rub Not [place]] : hearing was normal [Respiration, Rhythm And Depth] : normal respiratory rhythm and effort [Auscultation Breath Sounds / Voice Sounds] : lungs were clear to auscultation bilaterally [Heart Rate And Rhythm] : heart rate was normal and rhythm regular [Heart Sounds] : normal S1 and S2 [Bowel Sounds] : normal bowel sounds [Abdomen Soft] : soft [Abdomen Tenderness] : non-tender [No CVA Tenderness] : no ~M costovertebral angle tenderness [Abnormal Walk] : normal gait [] : no rash [Skin Lesions] : no skin lesions [Sensation] : the sensory exam was normal to light touch and pinprick [Motor Exam] : the motor exam was normal [No Focal Deficits] : no focal deficits [Oriented To Time, Place, And Person] : oriented to person, place, and time

## 2022-03-23 NOTE — REVIEW OF SYSTEMS
[As Noted in HPI] : as noted in HPI [Fever] : no fever [Chills] : no chills [Eyesight Problems] : no eyesight problems [Nosebleeds] : no nosebleeds [Nasal Discharge] : no nasal discharge [Chest Pain] : no chest pain [Cough] : no cough [SOB on Exertion] : no shortness of breath during exertion [Hesitancy] : no urinary hesitancy [Nocturia] : no nocturia [Limb Pain] : no limb pain [Limb Swelling] : no limb swelling [Itching] : no itching [Dizziness] : no dizziness [Fainting] : no fainting [Anxiety] : no anxiety [Muscle Weakness] : no muscle weakness [Swollen Glands] : no swollen glands [Swollen Glands In The Neck] : no swollen glands in the neck

## 2022-03-23 NOTE — ASSESSMENT
[FreeTextEntry1] : 1.h/o brbpr and family h/o colon cancer, recommend colonoscopy to r/o colon lesion,hemorrhoids etc\par \par Discussed risks including but not limited to bleeding,infection,drug reaction, perforation,missed lesion,benefits and alternatives of colonoscopy/egd with patient  including no\par treatment and patient consents to procedure.\par \par plan colonoscopy to be scheduled\par \par 2. chronic consitpation\par \par plan miralax daily

## 2022-03-23 NOTE — HISTORY OF PRESENT ILLNESS
[FreeTextEntry1] : 30 yo male with family h/o colon cancer, uncle with colon cancer 49, and grandfather 64 with colon cancer.\par unsure of history of parents and siblings.Patient with h/o brbpr 1 to 2 years ago. at  clinic told to do colonoscopy.\par Patient reports constipation, pt uses a suppository to have have bm.no weight loss. no abdominal pain, no nv/ no gerd.\par \par never had a colonoscopy

## 2022-03-24 ENCOUNTER — NON-APPOINTMENT (OUTPATIENT)
Age: 32
End: 2022-03-24

## 2022-03-24 LAB — T PALLIDUM AB SER QL IA: POSITIVE

## 2022-03-25 ENCOUNTER — NON-APPOINTMENT (OUTPATIENT)
Age: 32
End: 2022-03-25

## 2022-03-29 ENCOUNTER — APPOINTMENT (OUTPATIENT)
Dept: GASTROENTEROLOGY | Facility: CLINIC | Age: 32
End: 2022-03-29
Payer: MEDICAID

## 2022-03-29 PROCEDURE — 45378 DIAGNOSTIC COLONOSCOPY: CPT | Mod: 33

## 2022-04-04 ENCOUNTER — APPOINTMENT (OUTPATIENT)
Dept: GASTROENTEROLOGY | Facility: CLINIC | Age: 32
End: 2022-04-04

## 2022-04-22 LAB
HIV GENOSURE ARCHIVE 1: NORMAL
HIV1 PROVIR DNA RT + PR + IN MUT DET SEQ: NORMAL
HIV1 PROVIRAL DNA GENTYP BLD MC NAR: NORMAL

## 2022-07-19 ENCOUNTER — APPOINTMENT (OUTPATIENT)
Dept: INFECTIOUS DISEASE | Facility: CLINIC | Age: 32
End: 2022-07-19

## 2022-07-20 ENCOUNTER — NON-APPOINTMENT (OUTPATIENT)
Age: 32
End: 2022-07-20

## 2022-07-21 ENCOUNTER — NON-APPOINTMENT (OUTPATIENT)
Age: 32
End: 2022-07-21

## 2022-07-25 ENCOUNTER — NON-APPOINTMENT (OUTPATIENT)
Age: 32
End: 2022-07-25

## 2022-08-24 ENCOUNTER — NON-APPOINTMENT (OUTPATIENT)
Age: 32
End: 2022-08-24

## 2022-09-01 ENCOUNTER — FORM ENCOUNTER (OUTPATIENT)
Age: 32
End: 2022-09-01

## 2022-09-02 ENCOUNTER — APPOINTMENT (OUTPATIENT)
Dept: INFECTIOUS DISEASE | Facility: CLINIC | Age: 32
End: 2022-09-02

## 2022-09-02 ENCOUNTER — LABORATORY RESULT (OUTPATIENT)
Age: 32
End: 2022-09-02

## 2022-09-02 VITALS
BODY MASS INDEX: 20.88 KG/M2 | HEIGHT: 69 IN | TEMPERATURE: 97.6 F | WEIGHT: 141 LBS | OXYGEN SATURATION: 97 % | SYSTOLIC BLOOD PRESSURE: 155 MMHG | DIASTOLIC BLOOD PRESSURE: 76 MMHG | HEART RATE: 98 BPM

## 2022-09-02 DIAGNOSIS — R07.1 CHEST PAIN ON BREATHING: ICD-10-CM

## 2022-09-02 PROCEDURE — 99213 OFFICE O/P EST LOW 20 MIN: CPT

## 2022-09-02 NOTE — HISTORY OF PRESENT ILLNESS
[FreeTextEntry1] : 9/2/22\par AMY COTA is a 32 year old male being seen for a HIV follow-up visit. states  chest pain for 10 days,  now resolving. States sharp pain left side chest 2 days ago, worse with inspiration. Chest sounds clear, no pain upon inspiration. no pain upon palpation, denies recent COVID. Normal rate and rhythm, EKG was  normal in office. \par This is a 31 year old M with well controlled HIV infection. Today, the patient presents for a follow up visit. AMY is doing well with his medications. The adherence to the HIV regimen has been good and there has been no known changes to the regimen.\par Has been out of care for over one year. since 2019\par Had Moderna both doses 5/25 and 6/22/21\par  on 6 month old. \par Works for [employer]. \par Plan 9/2/22\par 1) Continue Biktarvy\par 2) all labs today and chest X ray today. Doubt pericarditis will get X ray today \par 3) must see cards ASAP\par 4) see in 4 months, pt was advised if chest pain worsens to go to the emergency room.\par  \par  \par Active Problems\par HIV disease (042) (B20)\par Syphilis (097.9) (A53.9)\par \par Past Medical History\par History of depression (V11.8) (Z86.59)\par Personal history of asthma (V12.69) (Z87.09)\par History of Visit for dental examination (V72.2) (Z01.20)\par History of Visit for eye and vision exam (V72.0) (Z01.00)\par \par Current Meds\par Ensure Oral Liquid; USE AS DIRECTED\par Stribild [id number] MG Oral Tablet; TAKE 1 TABLET BY MOUTH DAILY\par \par Allergies\par No Known Allergies\par \par \par  \par Active Problems\par HIV disease (042) (B20)\par Syphilis (097.9) (A53.9)\par \par Past Medical History\par History of depression (V11.8) (Z86.59)\par Personal history of asthma (V12.69) (Z87.09)\par History of Visit for dental examination (V72.2) (Z01.20)\par History of Visit for eye and vision exam (V72.0) (Z01.00)\par \par Current Meds\par Ensure Oral Liquid; USE AS DIRECTED\par Stribild [id number] MG Oral Tablet; TAKE 1 TABLET BY MOUTH DAILY\par \par Allergies\par No Known Allergies\par

## 2022-09-06 LAB
25(OH)D3 SERPL-MCNC: 23.1 NG/ML
ALBUMIN SERPL ELPH-MCNC: 4.9 G/DL
ALP BLD-CCNC: 77 U/L
ALT SERPL-CCNC: 54 U/L
ANION GAP SERPL CALC-SCNC: 12 MMOL/L
APPEARANCE: CLEAR
AST SERPL-CCNC: 50 U/L
BACTERIA: NEGATIVE
BASOPHILS # BLD AUTO: 0.01 K/UL
BASOPHILS NFR BLD AUTO: 0.2 %
BILIRUB SERPL-MCNC: 1.1 MG/DL
BILIRUBIN URINE: NEGATIVE
BLOOD URINE: NEGATIVE
BUN SERPL-MCNC: 9 MG/DL
C TRACH RRNA SPEC QL NAA+PROBE: NOT DETECTED
CALCIUM SERPL-MCNC: 9.9 MG/DL
CD3 CELLS # BLD: 1063 /UL
CD3 CELLS NFR BLD: 78 %
CD3+CD4+ CELLS # BLD: 338 /UL
CD3+CD4+ CELLS NFR BLD: 25 %
CD3+CD4+ CELLS/CD3+CD8+ CLL SPEC: 0.68 RATIO
CD3+CD8+ CELLS # SPEC: 500 /UL
CD3+CD8+ CELLS NFR BLD: 37 %
CHLORIDE SERPL-SCNC: 101 MMOL/L
CHOLEST SERPL-MCNC: 244 MG/DL
CO2 SERPL-SCNC: 27 MMOL/L
COLOR: YELLOW
CREAT SERPL-MCNC: 0.95 MG/DL
EGFR: 110 ML/MIN/1.73M2
EOSINOPHIL # BLD AUTO: 0 K/UL
EOSINOPHIL NFR BLD AUTO: 0 %
ESTIMATED AVERAGE GLUCOSE: 97 MG/DL
GLUCOSE QUALITATIVE U: NEGATIVE
GLUCOSE SERPL-MCNC: 88 MG/DL
HBA1C MFR BLD HPLC: 5 %
HCT VFR BLD CALC: 49.6 %
HCV AB SER QL: NONREACTIVE
HCV S/CO RATIO: 0.17 S/CO
HDLC SERPL-MCNC: 51 MG/DL
HGB BLD-MCNC: 15.6 G/DL
HIV1 RNA # SERPL NAA+PROBE: ABNORMAL
HIV1 RNA # SERPL NAA+PROBE: ABNORMAL COPIES/ML
HYALINE CASTS: 1 /LPF
IMM GRANULOCYTES NFR BLD AUTO: 0.2 %
KETONES URINE: NEGATIVE
LDLC SERPL CALC-MCNC: 172 MG/DL
LEUKOCYTE ESTERASE URINE: NEGATIVE
LYMPHOCYTES # BLD AUTO: 1.6 K/UL
LYMPHOCYTES NFR BLD AUTO: 28.8 %
MAN DIFF?: NORMAL
MCHC RBC-ENTMCNC: 31.5 GM/DL
MCHC RBC-ENTMCNC: 31.5 PG
MCV RBC AUTO: 100.2 FL
MICROSCOPIC-UA: NORMAL
MONOCYTES # BLD AUTO: 0.49 K/UL
MONOCYTES NFR BLD AUTO: 8.8 %
N GONORRHOEA RRNA SPEC QL NAA+PROBE: NOT DETECTED
NEUTROPHILS # BLD AUTO: 3.44 K/UL
NEUTROPHILS NFR BLD AUTO: 62 %
NITRITE URINE: NEGATIVE
NONHDLC SERPL-MCNC: 193 MG/DL
PH URINE: 7
PLATELET # BLD AUTO: 156 K/UL
POTASSIUM SERPL-SCNC: 4.5 MMOL/L
PROT SERPL-MCNC: 7.8 G/DL
PROTEIN URINE: NORMAL
PSA SERPL-MCNC: 1.69 NG/ML
RBC # BLD: 4.95 M/UL
RBC # FLD: 13.6 %
RED BLOOD CELLS URINE: 6 /HPF
SODIUM SERPL-SCNC: 139 MMOL/L
SOURCE AMPLIFICATION: NORMAL
SPECIFIC GRAVITY URINE: 1.03
SQUAMOUS EPITHELIAL CELLS: 0 /HPF
TRIGL SERPL-MCNC: 103 MG/DL
UROBILINOGEN URINE: ABNORMAL
VIRAL LOAD INTERP: NORMAL
VIRAL LOAD LOG: ABNORMAL LG COP/ML
WBC # FLD AUTO: 5.55 K/UL
WHITE BLOOD CELLS URINE: 1 /HPF

## 2022-09-13 LAB — T PALLIDUM AB SER QL IA: POSITIVE

## 2023-02-27 ENCOUNTER — APPOINTMENT (OUTPATIENT)
Dept: INFECTIOUS DISEASE | Facility: CLINIC | Age: 33
End: 2023-02-27

## 2023-03-01 ENCOUNTER — NON-APPOINTMENT (OUTPATIENT)
Age: 33
End: 2023-03-01

## 2023-03-02 ENCOUNTER — NON-APPOINTMENT (OUTPATIENT)
Age: 33
End: 2023-03-02

## 2023-03-06 ENCOUNTER — LABORATORY RESULT (OUTPATIENT)
Age: 33
End: 2023-03-06

## 2023-03-06 ENCOUNTER — APPOINTMENT (OUTPATIENT)
Dept: INFECTIOUS DISEASE | Facility: CLINIC | Age: 33
End: 2023-03-06
Payer: MEDICAID

## 2023-03-06 VITALS
HEIGHT: 69 IN | HEART RATE: 78 BPM | TEMPERATURE: 97.8 F | BODY MASS INDEX: 21.48 KG/M2 | WEIGHT: 145 LBS | OXYGEN SATURATION: 98 % | SYSTOLIC BLOOD PRESSURE: 112 MMHG | DIASTOLIC BLOOD PRESSURE: 71 MMHG

## 2023-03-06 DIAGNOSIS — R51.9 HEADACHE, UNSPECIFIED: ICD-10-CM

## 2023-03-06 LAB
25(OH)D3 SERPL-MCNC: 11 NG/ML
ALBUMIN SERPL ELPH-MCNC: 4.7 G/DL
ALP BLD-CCNC: 75 U/L
ALT SERPL-CCNC: 29 U/L
ANION GAP SERPL CALC-SCNC: 13 MMOL/L
APPEARANCE: CLEAR
AST SERPL-CCNC: 25 U/L
BACTERIA: NEGATIVE
BASOPHILS # BLD AUTO: 0.01 K/UL
BASOPHILS NFR BLD AUTO: 0.2 %
BILIRUB SERPL-MCNC: 0.5 MG/DL
BILIRUBIN URINE: NEGATIVE
BLOOD URINE: NEGATIVE
BUN SERPL-MCNC: 12 MG/DL
CALCIUM SERPL-MCNC: 10.3 MG/DL
CD3 CELLS # BLD: 1279 CELLS/UL
CD3 CELLS NFR BLD: 81 %
CD3+CD4+ CELLS # BLD: 361 CELLS/UL
CD3+CD4+ CELLS NFR BLD: 23 %
CD3+CD4+ CELLS/CD3+CD8+ CLL SPEC: 0.45 RATIO
CD3+CD8+ CELLS # SPEC: 793 CELLS/UL
CD3+CD8+ CELLS NFR BLD: 50 %
CHLORIDE SERPL-SCNC: 102 MMOL/L
CHOLEST SERPL-MCNC: 263 MG/DL
CO2 SERPL-SCNC: 24 MMOL/L
COLOR: YELLOW
CREAT SERPL-MCNC: 0.98 MG/DL
EGFR: 106 ML/MIN/1.73M2
EOSINOPHIL # BLD AUTO: 0 K/UL
EOSINOPHIL NFR BLD AUTO: 0 %
ESTIMATED AVERAGE GLUCOSE: 100 MG/DL
GLUCOSE QUALITATIVE U: NEGATIVE
GLUCOSE SERPL-MCNC: 90 MG/DL
HBA1C MFR BLD HPLC: 5.1 %
HCT VFR BLD CALC: 45.4 %
HCV AB SER QL: NONREACTIVE
HCV S/CO RATIO: 0.13 S/CO
HDLC SERPL-MCNC: 41 MG/DL
HGB BLD-MCNC: 15.8 G/DL
HYALINE CASTS: 0 /LPF
IMM GRANULOCYTES NFR BLD AUTO: 0.2 %
KETONES URINE: NEGATIVE
LDLC SERPL CALC-MCNC: 186 MG/DL
LEUKOCYTE ESTERASE URINE: NEGATIVE
LYMPHOCYTES # BLD AUTO: 1.5 K/UL
LYMPHOCYTES NFR BLD AUTO: 30 %
MAN DIFF?: NORMAL
MCHC RBC-ENTMCNC: 32 PG
MCHC RBC-ENTMCNC: 34.8 GM/DL
MCV RBC AUTO: 91.9 FL
MICROSCOPIC-UA: NORMAL
MONOCYTES # BLD AUTO: 0.41 K/UL
MONOCYTES NFR BLD AUTO: 8.2 %
NEUTROPHILS # BLD AUTO: 3.07 K/UL
NEUTROPHILS NFR BLD AUTO: 61.4 %
NITRITE URINE: NEGATIVE
NONHDLC SERPL-MCNC: 221 MG/DL
PH URINE: 6.5
PLATELET # BLD AUTO: 138 K/UL
POTASSIUM SERPL-SCNC: 4.2 MMOL/L
PROT SERPL-MCNC: 7.8 G/DL
PROTEIN URINE: NEGATIVE
RBC # BLD: 4.94 M/UL
RBC # FLD: 12.4 %
RED BLOOD CELLS URINE: 1 /HPF
SODIUM SERPL-SCNC: 139 MMOL/L
SPECIFIC GRAVITY URINE: 1.01
SQUAMOUS EPITHELIAL CELLS: 0 /HPF
TRIGL SERPL-MCNC: 177 MG/DL
UROBILINOGEN URINE: NORMAL
WBC # FLD AUTO: 5 K/UL
WHITE BLOOD CELLS URINE: 0 /HPF

## 2023-03-06 PROCEDURE — 99214 OFFICE O/P EST MOD 30 MIN: CPT

## 2023-03-06 NOTE — HISTORY OF PRESENT ILLNESS
[FreeTextEntry1] : 3/6/23\par AMY COTA is a 32 year old male being seen for a HIV follow-up visit. states chest pain for 5 months.  Chest sounds clear, no pain upon inspiration. no pain upon palpation, denies recent COVID. Normal rate and rhythm, EKG was normal in office. Also states headaches x 2 weeks, never had headaches before...needs Head CT . usually temporral and right temporal , No hearing loss or visual changes. Denies injury. \par This is a 31 year old M with well controlled HIV infection. Today, the patient presents for a follow up visit. AMY is doing well with his medications. The adherence to the HIV regimen has been good and there has been no known changes to the regimen.\par Has been out of care for over one year. since 2019\par Had Moderna both doses 5/25 and 6/22/21\par  on 6 month old. \par Works for [employer]. \par \par Plan 3/6/23\par 1) Continue Biktarvy\par 2) all labs today and chest X ray today. and EKG today and see Dr. Adriano Trimble cardiology  \par 3) needs head CT  for headaches ASAP\par 4) see in 4 months, pt was advised if chest pain worsens to go to the emergency room.\par  \par  \par Active Problems\par HIV disease (042) (B20)\par Syphilis (097.9) (A53.9)\par \par Past Medical History\par History of depression (V11.8) (Z86.59)\par Personal history of asthma (V12.69) (Z87.09)\par History of Visit for dental examination (V72.2) (Z01.20)\par History of Visit for eye and vision exam (V72.0) (Z01.00)\par \par Current Meds\par Ensure Oral Liquid; USE AS DIRECTED\par Stribild [id number] MG Oral Tablet; TAKE 1 TABLET BY MOUTH DAILY\par \par Allergies\par No Known Allergies\par \par \par  \par Active Problems\par HIV disease (042) (B20)\par Syphilis (097.9) (A53.9)\par \par Past Medical History\par History of depression (V11.8) (Z86.59)\par Personal history of asthma (V12.69) (Z87.09)\par History of Visit for dental examination (V72.2) (Z01.20)\par History of Visit for eye and vision exam (V72.0) (Z01.00)\par \par Current Meds\par Ensure Oral Liquid; USE AS DIRECTED\par Stribild [id number] MG Oral Tablet; TAKE 1 TABLET BY MOUTH DAILY\par \par Allergies\par No Known Allergies\par \par

## 2023-03-08 LAB
C TRACH RRNA SPEC QL NAA+PROBE: NOT DETECTED
HIV1 RNA # SERPL NAA+PROBE: 61
HIV1 RNA # SERPL NAA+PROBE: ABNORMAL
N GONORRHOEA RRNA SPEC QL NAA+PROBE: NOT DETECTED
SOURCE AMPLIFICATION: NORMAL
VIRAL LOAD INTERP: NORMAL
VIRAL LOAD LOG: 1.79

## 2023-03-13 LAB — T PALLIDUM AB SER QL IA: POSITIVE

## 2023-03-14 ENCOUNTER — APPOINTMENT (OUTPATIENT)
Dept: CT IMAGING | Facility: CLINIC | Age: 33
End: 2023-03-14
Payer: MEDICAID

## 2023-03-14 ENCOUNTER — APPOINTMENT (OUTPATIENT)
Dept: RADIOLOGY | Facility: CLINIC | Age: 33
End: 2023-03-14
Payer: MEDICAID

## 2023-03-14 ENCOUNTER — OUTPATIENT (OUTPATIENT)
Dept: OUTPATIENT SERVICES | Facility: HOSPITAL | Age: 33
LOS: 1 days | End: 2023-03-14
Payer: MEDICAID

## 2023-03-14 DIAGNOSIS — R51.9 HEADACHE, UNSPECIFIED: ICD-10-CM

## 2023-03-14 PROCEDURE — 71046 X-RAY EXAM CHEST 2 VIEWS: CPT | Mod: 26

## 2023-03-14 PROCEDURE — 70450 CT HEAD/BRAIN W/O DYE: CPT | Mod: 26

## 2023-03-14 PROCEDURE — 70450 CT HEAD/BRAIN W/O DYE: CPT

## 2023-03-14 PROCEDURE — 71046 X-RAY EXAM CHEST 2 VIEWS: CPT

## 2023-03-17 ENCOUNTER — APPOINTMENT (OUTPATIENT)
Dept: CARDIOLOGY | Facility: CLINIC | Age: 33
End: 2023-03-17

## 2023-03-20 ENCOUNTER — RX RENEWAL (OUTPATIENT)
Age: 33
End: 2023-03-20

## 2023-03-29 ENCOUNTER — RX RENEWAL (OUTPATIENT)
Age: 33
End: 2023-03-29

## 2023-03-29 ENCOUNTER — NON-APPOINTMENT (OUTPATIENT)
Age: 33
End: 2023-03-29

## 2023-07-24 ENCOUNTER — NON-APPOINTMENT (OUTPATIENT)
Age: 33
End: 2023-07-24

## 2023-07-25 ENCOUNTER — APPOINTMENT (OUTPATIENT)
Dept: INFECTIOUS DISEASE | Facility: CLINIC | Age: 33
End: 2023-07-25

## 2023-07-26 ENCOUNTER — NON-APPOINTMENT (OUTPATIENT)
Age: 33
End: 2023-07-26

## 2023-07-28 ENCOUNTER — NON-APPOINTMENT (OUTPATIENT)
Age: 33
End: 2023-07-28

## 2023-08-10 ENCOUNTER — NON-APPOINTMENT (OUTPATIENT)
Age: 33
End: 2023-08-10

## 2023-09-14 ENCOUNTER — NON-APPOINTMENT (OUTPATIENT)
Age: 33
End: 2023-09-14

## 2023-10-03 ENCOUNTER — NON-APPOINTMENT (OUTPATIENT)
Age: 33
End: 2023-10-03

## 2023-10-25 ENCOUNTER — APPOINTMENT (OUTPATIENT)
Dept: INFECTIOUS DISEASE | Facility: CLINIC | Age: 33
End: 2023-10-25

## 2023-11-24 ENCOUNTER — NON-APPOINTMENT (OUTPATIENT)
Age: 33
End: 2023-11-24

## 2023-12-10 ENCOUNTER — RX RENEWAL (OUTPATIENT)
Age: 33
End: 2023-12-10

## 2023-12-26 ENCOUNTER — NON-APPOINTMENT (OUTPATIENT)
Age: 33
End: 2023-12-26

## 2023-12-27 ENCOUNTER — NON-APPOINTMENT (OUTPATIENT)
Age: 33
End: 2023-12-27

## 2024-01-03 ENCOUNTER — LABORATORY RESULT (OUTPATIENT)
Age: 34
End: 2024-01-03

## 2024-01-03 ENCOUNTER — APPOINTMENT (OUTPATIENT)
Dept: INFECTIOUS DISEASE | Facility: CLINIC | Age: 34
End: 2024-01-03
Payer: MEDICAID

## 2024-01-03 VITALS
DIASTOLIC BLOOD PRESSURE: 85 MMHG | HEART RATE: 86 BPM | BODY MASS INDEX: 22.66 KG/M2 | WEIGHT: 153 LBS | OXYGEN SATURATION: 97 % | HEIGHT: 69 IN | SYSTOLIC BLOOD PRESSURE: 133 MMHG | TEMPERATURE: 99.6 F

## 2024-01-03 DIAGNOSIS — B20 HUMAN IMMUNODEFICIENCY VIRUS [HIV] DISEASE: ICD-10-CM

## 2024-01-03 DIAGNOSIS — Z01.00 ENCOUNTER FOR EXAMINATION OF EYES AND VISION W/OUT ABNORMAL FINDINGS: ICD-10-CM

## 2024-01-03 DIAGNOSIS — K02.9 DENTAL CARIES, UNSPECIFIED: ICD-10-CM

## 2024-01-03 DIAGNOSIS — Z01.20 ENCOUNTER FOR DENTAL EXAMINATION AND CLEANING W/OUT ABNORMAL FINDINGS: ICD-10-CM

## 2024-01-03 PROCEDURE — 90686 IIV4 VACC NO PRSV 0.5 ML IM: CPT

## 2024-01-03 PROCEDURE — 90471 IMMUNIZATION ADMIN: CPT

## 2024-01-03 PROCEDURE — 99214 OFFICE O/P EST MOD 30 MIN: CPT | Mod: 25

## 2024-01-03 PROCEDURE — G0008: CPT

## 2024-01-03 RX ORDER — BICTEGRAVIR SODIUM, EMTRICITABINE, AND TENOFOVIR ALAFENAMIDE FUMARATE 50; 200; 25 MG/1; MG/1; MG/1
50-200-25 TABLET ORAL
Qty: 30 | Refills: 5 | Status: ACTIVE | COMMUNITY
Start: 2021-10-18 | End: 1900-01-01

## 2024-01-03 RX ORDER — UBIDECARENONE/VIT E ACET 100MG-5
50 MCG CAPSULE ORAL
Qty: 30 | Refills: 3 | Status: ACTIVE | COMMUNITY
Start: 2022-03-23 | End: 1900-01-01

## 2024-01-03 NOTE — ASSESSMENT
[FreeTextEntry1] : Plan 3/6/23 1) Continue Biktarvy for HIV management 2) all labs today and see Dr. Adriano Trimble cardiology 3) see in 6 months

## 2024-01-03 NOTE — HISTORY OF PRESENT ILLNESS
[FreeTextEntry1] : 1/3/23----AMY COTA is a 33 year old male being seen for a HIV follow-up visit. states chest pain for 5 months. . Today, the patient presents for a follow up visit. AMY is doing well with his medications. The adherence to the HIV regimen has been good and there has been no known changes to the regimen. Had Moderna both doses 5/25 and 6/22/21 bisexual, has one child Works for [employer].  Plan 3/6/23 1) Continue Biktarvy for HIV management 2) all labs today and see Dr. Adriano Trimble cardiology 3) see in 6 months .

## 2024-01-04 LAB
25(OH)D3 SERPL-MCNC: 13.7 NG/ML
ALBUMIN SERPL ELPH-MCNC: 4.7 G/DL
ALP BLD-CCNC: 88 U/L
ALT SERPL-CCNC: 18 U/L
ANION GAP SERPL CALC-SCNC: 15 MMOL/L
APPEARANCE: CLEAR
AST SERPL-CCNC: 26 U/L
BILIRUB SERPL-MCNC: 1.1 MG/DL
BILIRUBIN URINE: NEGATIVE
BLOOD URINE: NEGATIVE
BUN SERPL-MCNC: 6 MG/DL
C TRACH RRNA SPEC QL NAA+PROBE: NOT DETECTED
C TRACH RRNA SPEC QL NAA+PROBE: NOT DETECTED
CALCIUM SERPL-MCNC: 9.6 MG/DL
CD3 CELLS # BLD: 1299 CELLS/UL
CD3 CELLS NFR BLD: 79 %
CD3+CD4+ CELLS # BLD: 367 CELLS/UL
CD3+CD4+ CELLS NFR BLD: 22 %
CD3+CD4+ CELLS/CD3+CD8+ CLL SPEC: 0.43 RATIO
CD3+CD8+ CELLS # SPEC: 852 CELLS/UL
CD3+CD8+ CELLS NFR BLD: 52 %
CHLORIDE SERPL-SCNC: 101 MMOL/L
CHOLEST SERPL-MCNC: 246 MG/DL
CO2 SERPL-SCNC: 26 MMOL/L
COLOR: YELLOW
CREAT SERPL-MCNC: 0.9 MG/DL
EGFR: 117 ML/MIN/1.73M2
ESTIMATED AVERAGE GLUCOSE: 91 MG/DL
GLUCOSE QUALITATIVE U: NEGATIVE MG/DL
GLUCOSE SERPL-MCNC: 96 MG/DL
HBA1C MFR BLD HPLC: 4.8 %
HCT VFR BLD CALC: 48.1 %
HDLC SERPL-MCNC: 50 MG/DL
HGB BLD-MCNC: 15.7 G/DL
HIV1 RNA # SERPL NAA+PROBE: 60
HIV1 RNA # SERPL NAA+PROBE: ABNORMAL
KETONES URINE: NEGATIVE MG/DL
LDLC SERPL CALC-MCNC: 168 MG/DL
LEUKOCYTE ESTERASE URINE: NEGATIVE
MCHC RBC-ENTMCNC: 31.3 PG
MCHC RBC-ENTMCNC: 32.6 GM/DL
MCV RBC AUTO: 96 FL
N GONORRHOEA RRNA SPEC QL NAA+PROBE: NOT DETECTED
N GONORRHOEA RRNA SPEC QL NAA+PROBE: NOT DETECTED
NITRITE URINE: NEGATIVE
NONHDLC SERPL-MCNC: 196 MG/DL
PH URINE: 8
PLATELET # BLD AUTO: 142 K/UL
POTASSIUM SERPL-SCNC: 4.1 MMOL/L
PROT SERPL-MCNC: 7.9 G/DL
PROTEIN URINE: NEGATIVE MG/DL
PSA SERPL-MCNC: 1.05 NG/ML
RBC # BLD: 5.01 M/UL
RBC # FLD: 12.5 %
SODIUM SERPL-SCNC: 142 MMOL/L
SOURCE ANAL: NORMAL
SOURCE ORAL: NORMAL
SPECIFIC GRAVITY URINE: 1.02
TRIGL SERPL-MCNC: 153 MG/DL
TSH SERPL-ACNC: 1.14 UIU/ML
UROBILINOGEN URINE: 1 MG/DL
VIRAL LOAD INTERP: NORMAL
VIRAL LOAD LOG: 1.78
WBC # FLD AUTO: 5.14 K/UL

## 2024-01-05 LAB
C TRACH RRNA SPEC QL NAA+PROBE: NOT DETECTED
N GONORRHOEA RRNA SPEC QL NAA+PROBE: NOT DETECTED
SOURCE AMPLIFICATION: NORMAL
T PALLIDUM AB SER QL IA: POSITIVE

## 2024-02-21 ENCOUNTER — APPOINTMENT (OUTPATIENT)
Dept: CARDIOLOGY | Facility: CLINIC | Age: 34
End: 2024-02-21
Payer: MEDICAID

## 2024-02-21 ENCOUNTER — NON-APPOINTMENT (OUTPATIENT)
Age: 34
End: 2024-02-21

## 2024-02-21 VITALS
HEART RATE: 79 BPM | WEIGHT: 145 LBS | OXYGEN SATURATION: 97 % | SYSTOLIC BLOOD PRESSURE: 118 MMHG | BODY MASS INDEX: 21.48 KG/M2 | HEIGHT: 69 IN | DIASTOLIC BLOOD PRESSURE: 70 MMHG

## 2024-02-21 DIAGNOSIS — E78.00 PURE HYPERCHOLESTEROLEMIA, UNSPECIFIED: ICD-10-CM

## 2024-02-21 DIAGNOSIS — R06.09 OTHER FORMS OF DYSPNEA: ICD-10-CM

## 2024-02-21 DIAGNOSIS — R07.9 CHEST PAIN, UNSPECIFIED: ICD-10-CM

## 2024-02-21 PROCEDURE — 93000 ELECTROCARDIOGRAM COMPLETE: CPT

## 2024-02-21 PROCEDURE — 99203 OFFICE O/P NEW LOW 30 MIN: CPT | Mod: 25

## 2024-03-15 ENCOUNTER — APPOINTMENT (OUTPATIENT)
Dept: CARDIOLOGY | Facility: CLINIC | Age: 34
End: 2024-03-15

## 2024-03-29 ENCOUNTER — APPOINTMENT (OUTPATIENT)
Dept: CARDIOLOGY | Facility: CLINIC | Age: 34
End: 2024-03-29

## 2024-07-01 ENCOUNTER — APPOINTMENT (OUTPATIENT)
Dept: INFECTIOUS DISEASE | Facility: CLINIC | Age: 34
End: 2024-07-01

## 2024-08-13 ENCOUNTER — APPOINTMENT (OUTPATIENT)
Dept: INFECTIOUS DISEASE | Facility: CLINIC | Age: 34
End: 2024-08-13

## 2024-08-14 ENCOUNTER — NON-APPOINTMENT (OUTPATIENT)
Age: 34
End: 2024-08-14

## 2024-08-15 ENCOUNTER — NON-APPOINTMENT (OUTPATIENT)
Age: 34
End: 2024-08-15

## 2024-08-23 ENCOUNTER — NON-APPOINTMENT (OUTPATIENT)
Age: 34
End: 2024-08-23

## 2024-09-20 ENCOUNTER — APPOINTMENT (OUTPATIENT)
Dept: INFECTIOUS DISEASE | Facility: CLINIC | Age: 34
End: 2024-09-20

## 2024-10-15 ENCOUNTER — LABORATORY RESULT (OUTPATIENT)
Age: 34
End: 2024-10-15

## 2024-10-15 ENCOUNTER — APPOINTMENT (OUTPATIENT)
Dept: INFECTIOUS DISEASE | Facility: CLINIC | Age: 34
End: 2024-10-15

## 2024-10-15 VITALS
HEIGHT: 69 IN | TEMPERATURE: 97.8 F | HEART RATE: 68 BPM | WEIGHT: 151 LBS | BODY MASS INDEX: 22.36 KG/M2 | OXYGEN SATURATION: 98 % | DIASTOLIC BLOOD PRESSURE: 72 MMHG | SYSTOLIC BLOOD PRESSURE: 116 MMHG

## 2024-10-15 DIAGNOSIS — Z01.20 ENCOUNTER FOR DENTAL EXAMINATION AND CLEANING W/OUT ABNORMAL FINDINGS: ICD-10-CM

## 2024-10-15 DIAGNOSIS — B20 HUMAN IMMUNODEFICIENCY VIRUS [HIV] DISEASE: ICD-10-CM

## 2024-10-15 PROCEDURE — G0008: CPT

## 2024-10-15 PROCEDURE — 99214 OFFICE O/P EST MOD 30 MIN: CPT | Mod: 25

## 2024-10-15 PROCEDURE — 90656 IIV3 VACC NO PRSV 0.5 ML IM: CPT

## 2024-10-15 RX ORDER — DOLUTEGRAVIR SODIUM AND LAMIVUDINE 50; 300 MG/1; MG/1
50-300 TABLET, FILM COATED ORAL
Qty: 30 | Refills: 5 | Status: ACTIVE | COMMUNITY
Start: 2024-10-15 | End: 1900-01-01

## 2024-10-16 LAB
25(OH)D3 SERPL-MCNC: 19.2 NG/ML
ALBUMIN SERPL ELPH-MCNC: 4.7 G/DL
ALP BLD-CCNC: 89 U/L
ALT SERPL-CCNC: 23 U/L
ANION GAP SERPL CALC-SCNC: 14 MMOL/L
APPEARANCE: CLEAR
AST SERPL-CCNC: 26 U/L
BACTERIA: NEGATIVE /HPF
BILIRUB SERPL-MCNC: 0.4 MG/DL
BILIRUBIN URINE: NEGATIVE
BLOOD URINE: NEGATIVE
BUN SERPL-MCNC: 8 MG/DL
C TRACH RRNA SPEC QL NAA+PROBE: NOT DETECTED
CALCIUM SERPL-MCNC: 10.2 MG/DL
CAST: 0 /LPF
CD3 CELLS # BLD: 1757 CELLS/UL
CD3 CELLS NFR BLD: 81 %
CD3+CD4+ CELLS # BLD: 450 CELLS/UL
CD3+CD4+ CELLS NFR BLD: 21 %
CD3+CD4+ CELLS/CD3+CD8+ CLL SPEC: 0.45 RATIO
CD3+CD8+ CELLS # SPEC: 1004 CELLS/UL
CD3+CD8+ CELLS NFR BLD: 46 %
CHLORIDE SERPL-SCNC: 99 MMOL/L
CHOLEST SERPL-MCNC: 280 MG/DL
CO2 SERPL-SCNC: 25 MMOL/L
COLOR: YELLOW
CREAT SERPL-MCNC: 0.91 MG/DL
EGFR: 115 ML/MIN/1.73M2
EPITHELIAL CELLS: 1 /HPF
ESTIMATED AVERAGE GLUCOSE: 105 MG/DL
GLUCOSE QUALITATIVE U: NEGATIVE MG/DL
GLUCOSE SERPL-MCNC: 93 MG/DL
HBA1C MFR BLD HPLC: 5.3 %
HBV CORE IGG+IGM SER QL: REACTIVE
HBV SURFACE AB SER QL: REACTIVE
HBV SURFACE AG SER QL: NONREACTIVE
HCT VFR BLD CALC: 51.8 %
HDLC SERPL-MCNC: 41 MG/DL
HGB BLD-MCNC: 16.5 G/DL
HIV1 RNA # SERPL NAA+PROBE: 284
HIV1 RNA # SERPL NAA+PROBE: ABNORMAL
KETONES URINE: NEGATIVE MG/DL
LDLC SERPL CALC-MCNC: 203 MG/DL
LEUKOCYTE ESTERASE URINE: NEGATIVE
MCHC RBC-ENTMCNC: 29.9 PG
MCHC RBC-ENTMCNC: 31.9 GM/DL
MCV RBC AUTO: 94 FL
MICROSCOPIC-UA: NORMAL
N GONORRHOEA RRNA SPEC QL NAA+PROBE: NOT DETECTED
NITRITE URINE: NEGATIVE
NONHDLC SERPL-MCNC: 239 MG/DL
PH URINE: 8
PLATELET # BLD AUTO: 142 K/UL
POTASSIUM SERPL-SCNC: 4.1 MMOL/L
PROT SERPL-MCNC: 8.5 G/DL
PROTEIN URINE: NEGATIVE MG/DL
PSA SERPL-MCNC: 0.89 NG/ML
RBC # BLD: 5.51 M/UL
RBC # FLD: 12.9 %
RED BLOOD CELLS URINE: 1 /HPF
SODIUM SERPL-SCNC: 138 MMOL/L
SOURCE AMPLIFICATION: NORMAL
SOURCE ANAL: NORMAL
SOURCE ORAL: NORMAL
SPECIFIC GRAVITY URINE: 1.02
T PALLIDUM AB SER QL IA: POSITIVE
TRIGL SERPL-MCNC: 190 MG/DL
TSH SERPL-ACNC: 1.89 UIU/ML
UROBILINOGEN URINE: 0.2 MG/DL
VIRAL LOAD INTERP: NORMAL
VIRAL LOAD LOG: 2.45
WBC # FLD AUTO: 6.24 K/UL
WHITE BLOOD CELLS URINE: 0 /HPF

## 2024-10-17 ENCOUNTER — OUTPATIENT (OUTPATIENT)
Dept: OUTPATIENT SERVICES | Facility: HOSPITAL | Age: 34
LOS: 1 days | End: 2024-10-17

## 2024-10-17 ENCOUNTER — APPOINTMENT (OUTPATIENT)
Dept: INTERNAL MEDICINE | Facility: CLINIC | Age: 34
End: 2024-10-17

## 2024-11-11 ENCOUNTER — NON-APPOINTMENT (OUTPATIENT)
Age: 34
End: 2024-11-11

## 2024-11-12 ENCOUNTER — APPOINTMENT (OUTPATIENT)
Dept: INFECTIOUS DISEASE | Facility: CLINIC | Age: 34
End: 2024-11-12
Payer: MEDICAID

## 2024-11-12 ENCOUNTER — LABORATORY RESULT (OUTPATIENT)
Age: 34
End: 2024-11-12

## 2024-11-12 VITALS
OXYGEN SATURATION: 97 % | BODY MASS INDEX: 22.22 KG/M2 | SYSTOLIC BLOOD PRESSURE: 114 MMHG | TEMPERATURE: 98.5 F | WEIGHT: 150 LBS | HEART RATE: 87 BPM | HEIGHT: 69 IN | DIASTOLIC BLOOD PRESSURE: 75 MMHG

## 2024-11-12 DIAGNOSIS — B20 HUMAN IMMUNODEFICIENCY VIRUS [HIV] DISEASE: ICD-10-CM

## 2024-11-12 DIAGNOSIS — Z01.20 ENCOUNTER FOR DENTAL EXAMINATION AND CLEANING W/OUT ABNORMAL FINDINGS: ICD-10-CM

## 2024-11-12 PROCEDURE — 99214 OFFICE O/P EST MOD 30 MIN: CPT

## 2024-11-13 ENCOUNTER — NON-APPOINTMENT (OUTPATIENT)
Age: 34
End: 2024-11-13

## 2024-11-14 LAB
CD3 CELLS # BLD: 1589 CELLS/UL
CD3 CELLS NFR BLD: 87 %
CD3+CD4+ CELLS # BLD: 527 CELLS/UL
CD3+CD4+ CELLS NFR BLD: 29 %
CD3+CD4+ CELLS/CD3+CD8+ CLL SPEC: 0.52 RATIO
CD3+CD8+ CELLS # SPEC: 1016 CELLS/UL
CD3+CD8+ CELLS NFR BLD: 55 %
HCV RNA SERPL NAA+PROBE-LOG IU: NOT DETECTED LOGIU/ML
HEPATITIS A IGG ANTIBODY: REACTIVE
HEPC RNA INTERP: NOT DETECTED
HIV1 RNA # SERPL NAA+PROBE: NORMAL
HIV1 RNA # SERPL NAA+PROBE: NORMAL COPIES/ML
VIRAL LOAD INTERP: NORMAL
VIRAL LOAD LOG: NORMAL LG COP/ML

## 2024-11-14 RX ORDER — CABOTEGRAVIR AND RILPIVIRINE 600-900/3
600 & 900 KIT INTRAMUSCULAR
Qty: 1 | Refills: 4 | Status: DISCONTINUED | COMMUNITY
Start: 2024-11-12 | End: 2024-11-12

## 2024-11-14 RX ORDER — CABOTEGRAVIR AND RILPIVIRINE 600-900/3
600 & 900 KIT INTRAMUSCULAR
Qty: 1 | Refills: 1 | Status: ACTIVE | COMMUNITY
Start: 2024-11-12

## 2024-11-14 RX ORDER — CABOTEGRAVIR AND RILPIVIRINE 600-900/3
600 & 900 KIT INTRAMUSCULAR
Qty: 1 | Refills: 6 | Status: ACTIVE | COMMUNITY
Start: 2024-11-12

## 2024-11-25 ENCOUNTER — NON-APPOINTMENT (OUTPATIENT)
Age: 34
End: 2024-11-25

## 2024-11-26 ENCOUNTER — APPOINTMENT (OUTPATIENT)
Dept: INFECTIOUS DISEASE | Facility: CLINIC | Age: 34
End: 2024-11-26
Payer: MEDICAID

## 2024-11-26 ENCOUNTER — EMERGENCY (EMERGENCY)
Facility: HOSPITAL | Age: 34
LOS: 1 days | Discharge: HOME | End: 2024-11-26
Attending: EMERGENCY MEDICINE
Payer: MEDICAID

## 2024-11-26 VITALS
OXYGEN SATURATION: 98 % | TEMPERATURE: 98.4 F | BODY MASS INDEX: 21.92 KG/M2 | WEIGHT: 148 LBS | HEART RATE: 88 BPM | HEIGHT: 69 IN | SYSTOLIC BLOOD PRESSURE: 125 MMHG | DIASTOLIC BLOOD PRESSURE: 71 MMHG

## 2024-11-26 VITALS
DIASTOLIC BLOOD PRESSURE: 83 MMHG | TEMPERATURE: 99 F | RESPIRATION RATE: 20 BRPM | SYSTOLIC BLOOD PRESSURE: 121 MMHG | HEART RATE: 83 BPM | WEIGHT: 147.93 LBS | OXYGEN SATURATION: 98 % | HEIGHT: 70 IN

## 2024-11-26 VITALS
HEART RATE: 70 BPM | RESPIRATION RATE: 18 BRPM | DIASTOLIC BLOOD PRESSURE: 71 MMHG | TEMPERATURE: 98 F | SYSTOLIC BLOOD PRESSURE: 112 MMHG | OXYGEN SATURATION: 99 %

## 2024-11-26 LAB
ALBUMIN SERPL ELPH-MCNC: 3.9 G/DL — SIGNIFICANT CHANGE UP (ref 3.3–5)
ALP SERPL-CCNC: 97 U/L — SIGNIFICANT CHANGE UP (ref 40–120)
ALT FLD-CCNC: 22 U/L — SIGNIFICANT CHANGE UP (ref 10–45)
ANION GAP SERPL CALC-SCNC: 13 MMOL/L — SIGNIFICANT CHANGE UP (ref 5–17)
APPEARANCE UR: CLEAR — SIGNIFICANT CHANGE UP
AST SERPL-CCNC: 21 U/L — SIGNIFICANT CHANGE UP (ref 10–40)
BASOPHILS # BLD AUTO: 0.03 K/UL — SIGNIFICANT CHANGE UP (ref 0–0.2)
BASOPHILS NFR BLD AUTO: 0.4 % — SIGNIFICANT CHANGE UP (ref 0–2)
BILIRUB SERPL-MCNC: 0.5 MG/DL — SIGNIFICANT CHANGE UP (ref 0.2–1.2)
BILIRUB UR-MCNC: NEGATIVE — SIGNIFICANT CHANGE UP
BUN SERPL-MCNC: 8 MG/DL — SIGNIFICANT CHANGE UP (ref 7–23)
CALCIUM SERPL-MCNC: 8.9 MG/DL — SIGNIFICANT CHANGE UP (ref 8.4–10.5)
CHLORIDE SERPL-SCNC: 102 MMOL/L — SIGNIFICANT CHANGE UP (ref 96–108)
CO2 SERPL-SCNC: 22 MMOL/L — SIGNIFICANT CHANGE UP (ref 22–31)
COLOR SPEC: YELLOW — SIGNIFICANT CHANGE UP
CREAT SERPL-MCNC: 0.9 MG/DL — SIGNIFICANT CHANGE UP (ref 0.5–1.3)
DIFF PNL FLD: NEGATIVE — SIGNIFICANT CHANGE UP
EGFR: 116 ML/MIN/1.73M2 — SIGNIFICANT CHANGE UP
EOSINOPHIL # BLD AUTO: 0.09 K/UL — SIGNIFICANT CHANGE UP (ref 0–0.5)
EOSINOPHIL NFR BLD AUTO: 1.2 % — SIGNIFICANT CHANGE UP (ref 0–6)
FLUAV AG NPH QL: SIGNIFICANT CHANGE UP
FLUBV AG NPH QL: SIGNIFICANT CHANGE UP
GAS PNL BLDV: SIGNIFICANT CHANGE UP
GLUCOSE SERPL-MCNC: 94 MG/DL — SIGNIFICANT CHANGE UP (ref 70–99)
GLUCOSE UR QL: NEGATIVE MG/DL — SIGNIFICANT CHANGE UP
HCT VFR BLD CALC: 41.2 % — SIGNIFICANT CHANGE UP (ref 39–50)
HGB BLD-MCNC: 14.3 G/DL — SIGNIFICANT CHANGE UP (ref 13–17)
IMM GRANULOCYTES NFR BLD AUTO: 0.4 % — SIGNIFICANT CHANGE UP (ref 0–0.9)
KETONES UR-MCNC: NEGATIVE MG/DL — SIGNIFICANT CHANGE UP
LEUKOCYTE ESTERASE UR-ACNC: NEGATIVE — SIGNIFICANT CHANGE UP
LYMPHOCYTES # BLD AUTO: 1.95 K/UL — SIGNIFICANT CHANGE UP (ref 1–3.3)
LYMPHOCYTES # BLD AUTO: 25.4 % — SIGNIFICANT CHANGE UP (ref 13–44)
MCHC RBC-ENTMCNC: 31.4 PG — SIGNIFICANT CHANGE UP (ref 27–34)
MCHC RBC-ENTMCNC: 34.7 G/DL — SIGNIFICANT CHANGE UP (ref 32–36)
MCV RBC AUTO: 90.5 FL — SIGNIFICANT CHANGE UP (ref 80–100)
MONOCYTES # BLD AUTO: 0.56 K/UL — SIGNIFICANT CHANGE UP (ref 0–0.9)
MONOCYTES NFR BLD AUTO: 7.3 % — SIGNIFICANT CHANGE UP (ref 2–14)
NEUTROPHILS # BLD AUTO: 5.01 K/UL — SIGNIFICANT CHANGE UP (ref 1.8–7.4)
NEUTROPHILS NFR BLD AUTO: 65.3 % — SIGNIFICANT CHANGE UP (ref 43–77)
NITRITE UR-MCNC: NEGATIVE — SIGNIFICANT CHANGE UP
NRBC # BLD: 0 /100 WBCS — SIGNIFICANT CHANGE UP (ref 0–0)
PH UR: 7.5 — SIGNIFICANT CHANGE UP (ref 5–8)
PLATELET # BLD AUTO: 155 K/UL — SIGNIFICANT CHANGE UP (ref 150–400)
POTASSIUM SERPL-MCNC: 3.9 MMOL/L — SIGNIFICANT CHANGE UP (ref 3.5–5.3)
POTASSIUM SERPL-SCNC: 3.9 MMOL/L — SIGNIFICANT CHANGE UP (ref 3.5–5.3)
PROT SERPL-MCNC: 8.1 G/DL — SIGNIFICANT CHANGE UP (ref 6–8.3)
PROT UR-MCNC: NEGATIVE MG/DL — SIGNIFICANT CHANGE UP
RBC # BLD: 4.55 M/UL — SIGNIFICANT CHANGE UP (ref 4.2–5.8)
RBC # FLD: 13.5 % — SIGNIFICANT CHANGE UP (ref 10.3–14.5)
RSV RNA NPH QL NAA+NON-PROBE: SIGNIFICANT CHANGE UP
SARS-COV-2 RNA SPEC QL NAA+PROBE: SIGNIFICANT CHANGE UP
SODIUM SERPL-SCNC: 137 MMOL/L — SIGNIFICANT CHANGE UP (ref 135–145)
SP GR SPEC: 1.01 — SIGNIFICANT CHANGE UP (ref 1–1.03)
UROBILINOGEN FLD QL: 1 MG/DL — SIGNIFICANT CHANGE UP (ref 0.2–1)
WBC # BLD: 7.67 K/UL — SIGNIFICANT CHANGE UP (ref 3.8–10.5)
WBC # FLD AUTO: 7.67 K/UL — SIGNIFICANT CHANGE UP (ref 3.8–10.5)

## 2024-11-26 PROCEDURE — 87637 SARSCOV2&INF A&B&RSV AMP PRB: CPT

## 2024-11-26 PROCEDURE — 99285 EMERGENCY DEPT VISIT HI MDM: CPT

## 2024-11-26 PROCEDURE — 71046 X-RAY EXAM CHEST 2 VIEWS: CPT | Mod: 26

## 2024-11-26 PROCEDURE — 82330 ASSAY OF CALCIUM: CPT

## 2024-11-26 PROCEDURE — 81003 URINALYSIS AUTO W/O SCOPE: CPT

## 2024-11-26 PROCEDURE — 96374 THER/PROPH/DIAG INJ IV PUSH: CPT

## 2024-11-26 PROCEDURE — 85014 HEMATOCRIT: CPT

## 2024-11-26 PROCEDURE — 82947 ASSAY GLUCOSE BLOOD QUANT: CPT

## 2024-11-26 PROCEDURE — 82803 BLOOD GASES ANY COMBINATION: CPT

## 2024-11-26 PROCEDURE — 87040 BLOOD CULTURE FOR BACTERIA: CPT

## 2024-11-26 PROCEDURE — 80053 COMPREHEN METABOLIC PANEL: CPT

## 2024-11-26 PROCEDURE — 82435 ASSAY OF BLOOD CHLORIDE: CPT

## 2024-11-26 PROCEDURE — 36415 COLL VENOUS BLD VENIPUNCTURE: CPT

## 2024-11-26 PROCEDURE — 99285 EMERGENCY DEPT VISIT HI MDM: CPT | Mod: 25

## 2024-11-26 PROCEDURE — 71046 X-RAY EXAM CHEST 2 VIEWS: CPT

## 2024-11-26 PROCEDURE — 85018 HEMOGLOBIN: CPT

## 2024-11-26 PROCEDURE — 99214 OFFICE O/P EST MOD 30 MIN: CPT

## 2024-11-26 PROCEDURE — 87086 URINE CULTURE/COLONY COUNT: CPT

## 2024-11-26 PROCEDURE — 96375 TX/PRO/DX INJ NEW DRUG ADDON: CPT

## 2024-11-26 PROCEDURE — 84132 ASSAY OF SERUM POTASSIUM: CPT

## 2024-11-26 PROCEDURE — 85025 COMPLETE CBC W/AUTO DIFF WBC: CPT

## 2024-11-26 PROCEDURE — 83605 ASSAY OF LACTIC ACID: CPT

## 2024-11-26 PROCEDURE — 84295 ASSAY OF SERUM SODIUM: CPT

## 2024-11-26 RX ORDER — ACETAMINOPHEN 500 MG
1000 TABLET ORAL ONCE
Refills: 0 | Status: COMPLETED | OUTPATIENT
Start: 2024-11-26 | End: 2024-11-26

## 2024-11-26 RX ORDER — SODIUM CHLORIDE 9 MG/ML
1000 INJECTION, SOLUTION INTRAMUSCULAR; INTRAVENOUS; SUBCUTANEOUS ONCE
Refills: 0 | Status: COMPLETED | OUTPATIENT
Start: 2024-11-26 | End: 2024-11-26

## 2024-11-26 RX ORDER — METOCLOPRAMIDE HCL 10 MG
10 TABLET ORAL ONCE
Refills: 0 | Status: COMPLETED | OUTPATIENT
Start: 2024-11-26 | End: 2024-11-26

## 2024-11-26 RX ADMIN — SODIUM CHLORIDE 1000 MILLILITER(S): 9 INJECTION, SOLUTION INTRAMUSCULAR; INTRAVENOUS; SUBCUTANEOUS at 13:03

## 2024-11-26 RX ADMIN — Medication 400 MILLIGRAM(S): at 13:03

## 2024-11-26 RX ADMIN — Medication 10 MILLIGRAM(S): at 13:02

## 2024-11-26 NOTE — ED CLERICAL - NS ED CLERK NOTE PRE-ARRIVAL INFORMATION; ADDITIONAL PRE-ARRIVAL INFORMATION
CC/Reason For referral: severe headache, 10/10 pain, sees ID clinic for HIV meds  Preferred Consultant(if applicable):  Who admits for you (if needed):  Do you have documents you would like to fax over?  Would you still like to speak to an ED attending? please call after pt is seen

## 2024-11-26 NOTE — ED ADULT NURSE NOTE - OBJECTIVE STATEMENT
34 year old male presents ambulatory to ED through waiting room from home complaining of body aches and fever x 1 week. Hx asthma, HIV. States he has had joint pain worse in his hands, feet and lower back for one week, has had fevers at home 100.4. went to urgent care did a chest xray and swab which were both negative. Sent in from HIV clinic for work up.

## 2024-11-26 NOTE — ED PROVIDER NOTE - PHYSICAL EXAMINATION
General: Alert and Orientated x 3. No apparent distress.  Eyes: No scleral icterus.   ENT: Mucous membranes moist  Cardiac: No murmurs appreciated.   Pulmonary: CTA bilaterally. No increased WOB.   Abdominal: Soft, Non-distended, non-tender   Neurologic: No focal sensory or motor deficits.  Back: No midline tenderness   Extremities: No lower extremity edema, bruising, soreness   Skin: Color appropriate for race. Intact, warm, and well-perfused.  Psychiatric: Appropriate mood and affect. No apparent risk to self or others.

## 2024-11-26 NOTE — ED ADULT TRIAGE NOTE - GLASGOW COMA SCALE: SCORE, MLM
[___] : [unfilled] [LVEF ___%] : LVEF [unfilled]% [Moderate] : moderate pulmonary hypertension [None] : normal LV function [Enlarged] : enlarged LA size [Mild] : mild mitral regurgitation 15

## 2024-11-26 NOTE — ED PROVIDER NOTE - OBJECTIVE STATEMENT
33 y/o male hx HIV well controlled presents with one week of left sided back pain, headache, and fever. He denies cough/congestion, difficulty breathing, chest pain, abdominal pain, dysuria/hematuria, bowel/urinary incontinence, rash, blurry vision, n/v/d. He does endorse mild constipation. He states he was at appointment with ID today and they told him to come to ER for evaluation.

## 2024-11-26 NOTE — ED PROVIDER NOTE - ATTENDING CONTRIBUTION TO CARE
35 y/o male hx HIV well controlled presents with one week of left sided back pain, headache, and fever. Patient states he has been having symptoms since the weekend over the weekend had a low-grade temp of 100.7 that his mother had taken since that time has been having generalized muscle aches worse in his lower back left side but he has been complaining of muscle aches for over a month diffusely he is discussed with his ID doctor already.  Patient is well-appearing complaining of left-sided headache and some mild nausea being treated for migraine no neck stiffness no meningeal signs patient is again well-appearing with stable vitals with undetectable viral loads recent labs were reviewed discussed with ID team any infectious workup was initiated. no uri sts.

## 2024-11-26 NOTE — ED PROVIDER NOTE - PATIENT PORTAL LINK FT
You can access the FollowMyHealth Patient Portal offered by [facility] by registering at the following website: [email or web address]. By joining [employer]’s FollowMyHealth portal, you will also be able to view your health information using other applications (apps) compatible with our system.

## 2024-11-26 NOTE — ED ADULT NURSE NOTE - CAS TRG GEN SKIN CONDITION
Elroy Wright was seen and treated in our emergency department on 11/25/2024.                Diagnosis:     Elroy  .    He may return on this date: 12/02/2024         If you have any questions or concerns, please don't hesitate to call.      Leonel Chua, DO    ______________________________           _______________          _______________  Hospital Representative                              Date                                Time
Warm

## 2024-11-26 NOTE — ED ADULT NURSE NOTE - NSFALLUNIVINTERV_ED_ALL_ED
Bed/Stretcher in lowest position, wheels locked, appropriate side rails in place/Call bell, personal items and telephone in reach/Instruct patient to call for assistance before getting out of bed/chair/stretcher/Non-slip footwear applied when patient is off stretcher/[place] to call system/Physically safe environment - no spills, clutter or unnecessary equipment/Purposeful proactive rounding/Room/bathroom lighting operational, light cord in reach

## 2024-11-26 NOTE — ED ADULT NURSE REASSESSMENT NOTE - NS ED NURSE REASSESS COMMENT FT1
Report taken from JUAN Cantrell. Pt received sitting in bed, A&O x 4. Breathing even and unlabored. Gross motor and neuro intact. States he is feeling better. VSS. Awaiting dispo. Call bell within reach.

## 2024-11-26 NOTE — ED PROVIDER NOTE - CLINICAL SUMMARY MEDICAL DECISION MAKING FREE TEXT BOX
33 y/o male hx HIV well controlled presents with one week of left sided back pain, headache, and fever. He is hemodynamically stable, afebrile, on exam he is generally well appearing with clear lungs, soft non-tender abdomen, no midline back pain, no neurologic deficits, no meningismus. Pain most c/w MSK back pain/tension headache, fever likely viral, though given immunocompromised status will do brief septic workup, sx control, reassess.

## 2024-11-27 LAB
CULTURE RESULTS: SIGNIFICANT CHANGE UP
SPECIMEN SOURCE: SIGNIFICANT CHANGE UP

## 2024-12-01 LAB
CULTURE RESULTS: SIGNIFICANT CHANGE UP
CULTURE RESULTS: SIGNIFICANT CHANGE UP
SPECIMEN SOURCE: SIGNIFICANT CHANGE UP
SPECIMEN SOURCE: SIGNIFICANT CHANGE UP

## 2024-12-03 ENCOUNTER — LABORATORY RESULT (OUTPATIENT)
Age: 34
End: 2024-12-03

## 2024-12-03 ENCOUNTER — NON-APPOINTMENT (OUTPATIENT)
Age: 34
End: 2024-12-03

## 2024-12-03 ENCOUNTER — APPOINTMENT (OUTPATIENT)
Dept: INFECTIOUS DISEASE | Facility: CLINIC | Age: 34
End: 2024-12-03
Payer: MEDICAID

## 2024-12-03 VITALS
OXYGEN SATURATION: 96 % | HEIGHT: 69 IN | DIASTOLIC BLOOD PRESSURE: 78 MMHG | WEIGHT: 148 LBS | BODY MASS INDEX: 21.92 KG/M2 | SYSTOLIC BLOOD PRESSURE: 117 MMHG | TEMPERATURE: 97.9 F | HEART RATE: 83 BPM

## 2024-12-03 DIAGNOSIS — B20 HUMAN IMMUNODEFICIENCY VIRUS [HIV] DISEASE: ICD-10-CM

## 2024-12-03 PROCEDURE — 99214 OFFICE O/P EST MOD 30 MIN: CPT

## 2024-12-05 ENCOUNTER — APPOINTMENT (OUTPATIENT)
Dept: INFECTIOUS DISEASE | Facility: CLINIC | Age: 34
End: 2024-12-05

## 2024-12-05 LAB — T PALLIDUM AB SER QL IA: POSITIVE

## 2024-12-05 RX ADMIN — PENICILLIN G BENZATHINE 0 UNIT/4ML: 2400000 INJECTION, SUSPENSION INTRAMUSCULAR at 00:00

## 2024-12-06 ENCOUNTER — MED ADMIN CHARGE (OUTPATIENT)
Age: 34
End: 2024-12-06

## 2024-12-06 ENCOUNTER — APPOINTMENT (OUTPATIENT)
Dept: INFECTIOUS DISEASE | Facility: CLINIC | Age: 34
End: 2024-12-06

## 2024-12-06 DIAGNOSIS — T80.89XA OTHER COMPLICATIONS FOLLOWING INFUSION, TRANSFUSION AND THERAPEUTIC INJECTION, INITIAL ENCOUNTER: ICD-10-CM

## 2024-12-06 DIAGNOSIS — R52 OTHER COMPLICATIONS FOLLOWING INFUSION, TRANSFUSION AND THERAPEUTIC INJECTION, INITIAL ENCOUNTER: ICD-10-CM

## 2024-12-06 PROCEDURE — 96372 THER/PROPH/DIAG INJ SC/IM: CPT

## 2024-12-06 RX ORDER — PENICILLIN G BENZATHINE 2400000 [IU]/4ML
2400000 INJECTION, SUSPENSION INTRAMUSCULAR
Qty: 0 | Refills: 0 | Status: COMPLETED | OUTPATIENT
Start: 2024-12-05

## 2024-12-06 RX ORDER — IBUPROFEN 200 MG/1
200 TABLET ORAL
Qty: 2 | Refills: 0 | Status: ACTIVE | COMMUNITY
Start: 2024-12-06 | End: 1900-01-01

## 2024-12-11 ENCOUNTER — NON-APPOINTMENT (OUTPATIENT)
Age: 34
End: 2024-12-11

## 2024-12-11 ENCOUNTER — MED ADMIN CHARGE (OUTPATIENT)
Age: 34
End: 2024-12-11

## 2024-12-11 ENCOUNTER — APPOINTMENT (OUTPATIENT)
Dept: INFECTIOUS DISEASE | Facility: CLINIC | Age: 34
End: 2024-12-11
Payer: MEDICAID

## 2024-12-11 PROCEDURE — 96372 THER/PROPH/DIAG INJ SC/IM: CPT

## 2024-12-12 RX ADMIN — CABOTEGRAVIR AND RILPIVIRINE 0 MG/3ML: KIT at 00:00

## 2024-12-13 ENCOUNTER — NON-APPOINTMENT (OUTPATIENT)
Age: 34
End: 2024-12-13

## 2024-12-16 ENCOUNTER — NON-APPOINTMENT (OUTPATIENT)
Age: 34
End: 2024-12-16

## 2024-12-18 ENCOUNTER — APPOINTMENT (OUTPATIENT)
Dept: NEUROLOGY | Facility: CLINIC | Age: 34
End: 2024-12-18

## 2025-01-14 ENCOUNTER — NON-APPOINTMENT (OUTPATIENT)
Age: 35
End: 2025-01-14

## 2025-01-14 ENCOUNTER — APPOINTMENT (OUTPATIENT)
Dept: INFECTIOUS DISEASE | Facility: CLINIC | Age: 35
End: 2025-01-14

## 2025-01-15 ENCOUNTER — MED ADMIN CHARGE (OUTPATIENT)
Age: 35
End: 2025-01-15

## 2025-01-15 ENCOUNTER — NON-APPOINTMENT (OUTPATIENT)
Age: 35
End: 2025-01-15

## 2025-01-15 ENCOUNTER — APPOINTMENT (OUTPATIENT)
Dept: INFECTIOUS DISEASE | Facility: CLINIC | Age: 35
End: 2025-01-15
Payer: MEDICAID

## 2025-01-15 PROCEDURE — 96372 THER/PROPH/DIAG INJ SC/IM: CPT

## 2025-01-16 RX ADMIN — CABOTEGRAVIR AND RILPIVIRINE 0 MG/3ML: KIT at 00:00

## 2025-02-03 ENCOUNTER — APPOINTMENT (OUTPATIENT)
Dept: INFECTIOUS DISEASE | Facility: CLINIC | Age: 35
End: 2025-02-03

## 2025-02-04 ENCOUNTER — NON-APPOINTMENT (OUTPATIENT)
Age: 35
End: 2025-02-04

## 2025-02-21 NOTE — ED ADULT NURSE NOTE - NEURO ASSESSMENT
What Type Of Note Output Would You Prefer (Optional)?: Standard Output Hpi Title: Evaluation of Skin Lesions - - -

## 2025-03-11 ENCOUNTER — APPOINTMENT (OUTPATIENT)
Dept: INFECTIOUS DISEASE | Facility: CLINIC | Age: 35
End: 2025-03-11
Payer: MEDICAID

## 2025-03-11 ENCOUNTER — LABORATORY RESULT (OUTPATIENT)
Age: 35
End: 2025-03-11

## 2025-03-11 VITALS
HEART RATE: 70 BPM | SYSTOLIC BLOOD PRESSURE: 128 MMHG | TEMPERATURE: 98.2 F | HEIGHT: 69 IN | OXYGEN SATURATION: 99 % | WEIGHT: 149 LBS | DIASTOLIC BLOOD PRESSURE: 80 MMHG | BODY MASS INDEX: 22.07 KG/M2

## 2025-03-11 DIAGNOSIS — B20 HUMAN IMMUNODEFICIENCY VIRUS [HIV] DISEASE: ICD-10-CM

## 2025-03-11 DIAGNOSIS — Z01.20 ENCOUNTER FOR DENTAL EXAMINATION AND CLEANING W/OUT ABNORMAL FINDINGS: ICD-10-CM

## 2025-03-11 PROCEDURE — 96372 THER/PROPH/DIAG INJ SC/IM: CPT

## 2025-03-11 PROCEDURE — 99214 OFFICE O/P EST MOD 30 MIN: CPT | Mod: 25

## 2025-03-11 RX ADMIN — CABOTEGRAVIR AND RILPIVIRINE 0 MG/3ML: KIT at 00:00

## 2025-03-14 ENCOUNTER — NON-APPOINTMENT (OUTPATIENT)
Age: 35
End: 2025-03-14

## 2025-03-14 LAB
25(OH)D3 SERPL-MCNC: 20.3 NG/ML
ALBUMIN SERPL ELPH-MCNC: 4.6 G/DL
ALP BLD-CCNC: 79 U/L
ALT SERPL-CCNC: 21 U/L
ANION GAP SERPL CALC-SCNC: 12 MMOL/L
APPEARANCE: CLEAR
AST SERPL-CCNC: 23 U/L
BACTERIA: NEGATIVE /HPF
BILIRUB SERPL-MCNC: 0.7 MG/DL
BILIRUBIN URINE: NEGATIVE
BLOOD URINE: NEGATIVE
BUN SERPL-MCNC: 9 MG/DL
C TRACH RRNA SPEC QL NAA+PROBE: NOT DETECTED
CALCIUM SERPL-MCNC: 10.1 MG/DL
CAST: 0 /LPF
CD3 CELLS # BLD: 1705 CELLS/UL
CD3 CELLS NFR BLD: 80 %
CD3+CD4+ CELLS # BLD: 493 CELLS/UL
CD3+CD4+ CELLS NFR BLD: 23 %
CD3+CD4+ CELLS/CD3+CD8+ CLL SPEC: 0.44 RATIO
CD3+CD8+ CELLS # SPEC: 1126 CELLS/UL
CD3+CD8+ CELLS NFR BLD: 53 %
CHLORIDE SERPL-SCNC: 103 MMOL/L
CHOLEST SERPL-MCNC: 252 MG/DL
CO2 SERPL-SCNC: 26 MMOL/L
COLOR: YELLOW
CREAT SERPL-MCNC: 0.95 MG/DL
EGFRCR SERPLBLD CKD-EPI 2021: 109 ML/MIN/1.73M2
EPITHELIAL CELLS: 0 /HPF
ESTIMATED AVERAGE GLUCOSE: 103 MG/DL
GLUCOSE QUALITATIVE U: NEGATIVE MG/DL
GLUCOSE SERPL-MCNC: 89 MG/DL
HBA1C MFR BLD HPLC: 5.2 %
HCT VFR BLD CALC: 50.4 %
HDLC SERPL-MCNC: 48 MG/DL
HGB BLD-MCNC: 16.5 G/DL
HIV1 RNA # SERPL NAA+PROBE: NORMAL
HIV1 RNA # SERPL NAA+PROBE: NORMAL COPIES/ML
KETONES URINE: NEGATIVE MG/DL
LDLC SERPL CALC-MCNC: 175 MG/DL
LEUKOCYTE ESTERASE URINE: NEGATIVE
MCHC RBC-ENTMCNC: 30.8 PG
MCHC RBC-ENTMCNC: 32.7 G/DL
MCV RBC AUTO: 94 FL
MICROSCOPIC-UA: NORMAL
N GONORRHOEA RRNA SPEC QL NAA+PROBE: NOT DETECTED
NITRITE URINE: NEGATIVE
NONHDLC SERPL-MCNC: 204 MG/DL
PH URINE: 7.5
PLATELET # BLD AUTO: 151 K/UL
POTASSIUM SERPL-SCNC: 3.7 MMOL/L
PROT SERPL-MCNC: 8.2 G/DL
PROTEIN URINE: NEGATIVE MG/DL
RBC # BLD: 5.36 M/UL
RBC # FLD: 14.6 %
RED BLOOD CELLS URINE: 1 /HPF
SODIUM SERPL-SCNC: 141 MMOL/L
SOURCE AMPLIFICATION: NORMAL
SPECIFIC GRAVITY URINE: 1.01
T PALLIDUM AB SER QL IA: POSITIVE
TRIGL SERPL-MCNC: 160 MG/DL
TSH SERPL-ACNC: 1.22 UIU/ML
UROBILINOGEN URINE: 1 MG/DL
VIRAL LOAD INTERP: NORMAL
VIRAL LOAD LOG: NORMAL LG COP/ML
WBC # FLD AUTO: 7.25 K/UL
WHITE BLOOD CELLS URINE: 0 /HPF

## 2025-05-12 ENCOUNTER — APPOINTMENT (OUTPATIENT)
Dept: INFECTIOUS DISEASE | Facility: CLINIC | Age: 35
End: 2025-05-12
Payer: SELF-PAY

## 2025-05-12 ENCOUNTER — MED ADMIN CHARGE (OUTPATIENT)
Age: 35
End: 2025-05-12

## 2025-05-12 ENCOUNTER — NON-APPOINTMENT (OUTPATIENT)
Age: 35
End: 2025-05-12

## 2025-05-12 PROCEDURE — 96372 THER/PROPH/DIAG INJ SC/IM: CPT

## 2025-05-12 RX ADMIN — CABOTEGRAVIR AND RILPIVIRINE 0 MG/3ML: KIT at 00:00

## 2025-07-07 ENCOUNTER — APPOINTMENT (OUTPATIENT)
Dept: INFECTIOUS DISEASE | Facility: CLINIC | Age: 35
End: 2025-07-07

## 2025-07-08 ENCOUNTER — APPOINTMENT (OUTPATIENT)
Dept: INFECTIOUS DISEASE | Facility: CLINIC | Age: 35
End: 2025-07-08
Payer: MEDICAID

## 2025-07-08 PROCEDURE — 96372 THER/PROPH/DIAG INJ SC/IM: CPT

## 2025-07-08 RX ADMIN — CABOTEGRAVIR AND RILPIVIRINE 0 MG/3ML: KIT at 00:00

## 2025-09-02 ENCOUNTER — APPOINTMENT (OUTPATIENT)
Dept: INFECTIOUS DISEASE | Facility: CLINIC | Age: 35
End: 2025-09-02

## 2025-09-02 PROCEDURE — 96372 THER/PROPH/DIAG INJ SC/IM: CPT

## 2025-09-02 RX ADMIN — CABOTEGRAVIR AND RILPIVIRINE 0 MG/3ML: KIT at 00:00

## 2025-09-09 ENCOUNTER — APPOINTMENT (OUTPATIENT)
Dept: INFECTIOUS DISEASE | Facility: CLINIC | Age: 35
End: 2025-09-09

## 2025-09-11 ENCOUNTER — NON-APPOINTMENT (OUTPATIENT)
Age: 35
End: 2025-09-11

## 2025-09-15 ENCOUNTER — NON-APPOINTMENT (OUTPATIENT)
Age: 35
End: 2025-09-15